# Patient Record
Sex: MALE | Race: WHITE | URBAN - METROPOLITAN AREA
[De-identification: names, ages, dates, MRNs, and addresses within clinical notes are randomized per-mention and may not be internally consistent; named-entity substitution may affect disease eponyms.]

---

## 2017-09-01 ENCOUNTER — TRANSFERRED RECORDS (OUTPATIENT)
Dept: HEALTH INFORMATION MANAGEMENT | Facility: CLINIC | Age: 67
End: 2017-09-01

## 2017-11-01 ENCOUNTER — PRE VISIT (OUTPATIENT)
Dept: NEUROLOGY | Facility: CLINIC | Age: 67
End: 2017-11-01

## 2017-11-01 PROBLEM — G24.4 OROMANDIBULAR DYSTONIA: Status: ACTIVE | Noted: 2017-11-01

## 2017-11-01 PROBLEM — R25.9 ABNORMAL INVOLUNTARY MOVEMENT: Status: ACTIVE | Noted: 2017-11-01

## 2017-11-01 PROBLEM — G24.4 OROFACIAL DYSKINESIA: Status: ACTIVE | Noted: 2017-11-01

## 2017-11-01 RX ORDER — INFLUENZA A VIRUS A/VICTORIA/4897/2022 IVR-238 (H1N1) ANTIGEN (FORMALDEHYDE INACTIVATED), INFLUENZA A VIRUS A/CALIFORNIA/122/2022 SAN-022 (H3N2) ANTIGEN (FORMALDEHYDE INACTIVATED), AND INFLUENZA B VIRUS B/MICHIGAN/01/2021 ANTIGEN (FORMALDEHYDE INACTIVATED) 60; 60; 60 UG/.5ML; UG/.5ML; UG/.5ML
INJECTION, SUSPENSION INTRAMUSCULAR
Refills: 0 | COMMUNITY
Start: 2017-10-28 | End: 2017-11-06

## 2017-11-01 RX ORDER — CLONAZEPAM 0.5 MG/1
0.5 TABLET ORAL AT BEDTIME
COMMUNITY
End: 2017-11-06

## 2017-11-01 RX ORDER — SIMVASTATIN 40 MG
40 TABLET ORAL AT BEDTIME
COMMUNITY
Start: 2017-09-06 | End: 2017-11-06

## 2017-11-01 RX ORDER — AMOXICILLIN 875 MG
TABLET ORAL
Refills: 0 | COMMUNITY
Start: 2017-02-07 | End: 2017-11-06

## 2017-11-01 RX ORDER — FLUTICASONE PROPIONATE 50 MCG
SPRAY, SUSPENSION (ML) NASAL
Refills: 11 | COMMUNITY
Start: 2016-11-12 | End: 2017-11-06

## 2017-11-01 RX ORDER — CLONAZEPAM 0.5 MG/1
TABLET ORAL
Refills: 0 | COMMUNITY
Start: 2017-01-10 | End: 2017-11-06

## 2017-11-01 RX ORDER — SIMVASTATIN 20 MG
20 TABLET ORAL AT BEDTIME
Refills: 3 | COMMUNITY
Start: 2017-05-19 | End: 2017-11-06 | Stop reason: DRUGHIGH

## 2017-11-01 RX ORDER — SIMVASTATIN 40 MG
40 TABLET ORAL AT BEDTIME
Refills: 3 | COMMUNITY
Start: 2017-09-10

## 2017-11-01 NOTE — TELEPHONE ENCOUNTER
Left message with detailed instructions requesting pt call the clinic back to complete previsit call. Kelsey George RN

## 2017-11-02 NOTE — PROGRESS NOTES
Summary and Recommendations:     Orofacial dyskinesias - onset with stress but movements persist despite improvement in stress. Mouth guard - lower mouth helps the symptoms. He has not been on medication for this but has tried magic mouthwash and has not taken pills or tried botox for this. No clear exposure to neuroleptic medication. Denies exposure to metoclopramide He has no family history of like condition. He has a brother Jeremi with parkinson disease (and  Had polio). He denies a family history of OCD, tics or learning disability and he has not had these features.     Information provided in avs about treatment options.     Tardive movement problems - which he does not have but there is useful information below:  Https://www.aan.com/Guidelines/home/GetGuidelineContent/613  https://www.aan.com/Guidelines/home/GetGuidelineContent/614    Gingko biloba 80mg 3 times per day. Monitor for affects on anticoagulation and antidepressants    Clonazepam (klonopin)  Option  - in the benzodiazepine/valium type of medication.     Then there are 3 newer types of medications with risks and if interested he would probably need to visit with Annalisa Giron D in neurology to review these medications.     TETRABENAZINE (NITOMAN )  Description and Uses  Tetrabenazine is used to treat movement disorders. Typically it is used for the treatment of hyperkinetic movement disorders such as [place] s disease and other conditions with chorea, hemiballismus, and tardive dyskinesia. It also may be employed for tic disorders including Gille s de la Tourette Syndrome. Hyperkinesias are characterized by excessive, involuntary purposeless and repetitive movements, which may involve the face, limbs or the entire body. Tetrabenazine works by depleting the brain.s supply of the neurochemical dopamine.  Dosage  12.5mg/day x 1 week; then 12.5mg twice daily x 1wk; then 25mg in am and 12.5mg in pm x 1wk then 25mg twice daily.  The dose is  adjusted based on tolerance and responsiveness to the medication.  Side Effects  ? Depression  ? Slowed movements of hands, arms, legs or head similar to Parkinson.s disease.  ? Drowsiness or Sleepiness  ? Digestive Problems  ? Lowering of Blood Pressure  ? Nervousness or Anxiety  ? Insomnia  Contact your doctor immediately if you have:  ? Difficulty swallowing  ? Choking attacks  ? Stiffness or tightness in arms or legs  ? Confusion or irrational thoughts  Do not take this medication at the same time as:  ? Levodopa  ? Antidepressants and MAO Inhibitors  ? Alcohol  Ordering Information  [facility]  [facility]  [address]  [facility]  Tel: [phone]  Fax: [phone]  Do not stop your medication without talking to your doctor  or a neurology resident on call.    Austedo (deutetrabenazine) dosed 6mg daily and can be increased in 6mg increments up to 48mg/day. 50% of patients reported improvement in their symptoms and 40% of providers observed improvement in their patients. This data is from patients with chorea in Slade's Disease. There is an increased risk of suicidal idea and depression with this medication but patient denies any mood disorder. Side effects include sleepiness, diarrhea, tiredness, dry mouth, prolonged QT interval. This medication costs ~$4000 for #60 of the 6mg capsules.    Ingrezza (valbenazine) dosed 40mg daily for 1 week and then can increase to 80mg daily. 1 in 4 patients treated for 6 weeks on 80mg of Ingrezza have severity of symptoms reduced by >50%. Brief review of studies showed this data is from patients with TD due to exposure to neuroleptics. Side effects: somnolence, increased QT interval. Patient is not on any other medications that would contribute to prolonged QT interval. This is a specialty medication and likely would have to be filled through a specialty pharmacy. Cost  ~$5000-10,000/month.  Specialty Pharmacy could help us navigate coverage for the patient.    Consideration for botox if desired and would inject masseter/temporalis through neurology, PMR or/and ENT.    Continue to use the mouth guard and chewing gum to distract them from the involuntary movements, ie a sensory trick to address this.     Jose Browning MD  _____________________________________________________________________  PATIENT: [place] Parmar  67 year old male   : 1950  CINDY: 2017    Consult requested by pcp/specialist    Outside records reviewed and revealed  - inserted.     History obtained from patient    History of Present Illness  68 yo man with orofacial involuntary movements  4 yr history of food in teeth and food stuck in gums and has worsening of symptoms as the day progresses. Food triggers it. He has to constantly lick his lips and has movement of his mouth and rubbing of his tongue. Wearing a splint helps. He had onset of symptoms after loss of a job but despite resolution of the stress the movements continue.     Medications  Lidocaine  amoxicilline  Clonazepam  flonase  fluzone  Simvastatin    Wears glasses  Hearing is affected  [place] and thinks he has altered smell for a long time  No constipation or diarrhea  He has been on prilosec for 30 yrs and takes it as needed  Endo: on simvastatin for cholesterol. Denies diabetes or thyroid  Denies heart problems  Denies lung problems  Msk: denies  Allergies: denies  Id: denies  Heme: denies  Psych: none  Neuro: none  Sleep: as well as people his age sleeps at night. May snore occasionally.  Denies RBD features  Wakes up a lot at night  Light sleeper. Lives in a corner house.     Denies exposure to psychiatric medications  Denies reglan or metoclopramide.   Memory has been good.     He denies parkinson features  - handwriting is okay, walking is okay, voice is okay.     He has movement of his tongue in his mouth  without him being aware of it moving.  He has to be cognizant to keep it from moving around  He notes that it moves to his teeth.   He has a sensation in his mouth =- it is a ache like after a strong teeth cleaning  He has a sensation that there is food in his teeth.   When he eats - afterwards he has this sensation the most  He has worsening of symptoms at the end of the day  He has a mouth guard and not sure if he grinds his teeth at night.   He may have some clenching of his teeth - more at night than during the day.   He does some jaw pain when he has an active day of more sensation in his mouth.   His major complaint is the persistent sensation which has been present for 3-4 yrs.   He has seen dentists, etc.   He has tried magic mouth wash.   He has not tried botox.     14 Review of systems  are negative except for   Patient Active Problem List   Diagnosis     Acrochordon     Pruritus ani      No Known Allergies  No past surgical history on file.  No past medical history on file.  Social History     Social History     Marital status:      Spouse name: N/A     Number of children: N/A     Years of education: N/A     Occupational History     Not on file.     Social History Main Topics     Smoking status: Never Smoker     Smokeless tobacco: Never Used     Alcohol use Yes     Drug use: Not on file     Sexual activity: Not on file     Other Topics Concern     Not on file     Social History Narrative     No narrative on file     Family History   Problem Relation Age of Onset     Alzheimer Disease Mother      Cataracts Mother      Stomach Cancer Father      Parkinsonism Other      brother     Substance Abuse Other      brother     Colon Cancer Other      brother     Bronchitis Other      brother     Obesity Other      sister     Current Outpatient Prescriptions   Medication Sig Dispense Refill     simvastatin (ZOCOR) 40 MG tablet Take 40 mg by mouth At Bedtime       clonazePAM (KLONOPIN) 0.5 MG tablet Take 0.5  mg by mouth At Bedtime       MAGIC MOUTHWASH, ENTER INGREDIENTS IN COMMENTS, SWISH AND SPIT 5 ML'S FOR 2-3 MIN 4-5 TIMES DAILY FOR 2 WEEKS THEN USE AS NEEDED  11     alum & mag hydroxide-simethicone 80 mL, diphenhydrAMINE 80 mL, lidocaine (viscous) 80 mL magic mouthwash SWISH AND SPIT 5 ML'S FOR 2-3 MIN 4-5 TIMES DAILY FOR 2 WEEKS THEN USE AS NEEDED  11     Mouthwash Compounding Base (MOUTH WASH-GP) LIQD rhodus  12     amoxicillin (AMOXIL) 875 MG tablet TAKE 1 TABLET (875 MG TOTAL) BY MOUTH 2 (TWO) TIMES A DAY FOR 10 DAYS.  0     clonazePAM (KLONOPIN) 0.5 MG tablet   0     fluticasone (FLONASE) 50 MCG/ACT spray PLACE 2 SPRAYS INTO BOTH NOSTRILS DAILY.  11     FLUZONE HIGH-DOSE 0.5 ML injection TO BE ADMINISTERED BY PHARMACIST FOR IMMUNIZATION  0     simvastatin (ZOCOR) 20 MG tablet Take 20 mg by mouth At Bedtime  3     simvastatin (ZOCOR) 40 MG tablet Take 40 mg by mouth At Bedtime  3         Meds                                                                                                                                                                           Examination  B/P: Data Unavailable, T: Data Unavailable, P: Data Unavailable, R: Data Unavailable 0 lbs 0 oz  There were no vitals taken for this visit., There is no height or weight on file to calculate BMI.    Vitals signs were added and reviewed if not above. Please refer to the chart from this visit.    General examination: well developed, nourished and normal affect  Carotid: No bruits. Chest CTA, Heart regular without gallops or murmurs. Abdomen soft nontender, no masses, bowel sounds intact. Periphery: normal pulses without edema. No skin lesions. MENTAL STATUS:  Alert, oriented x3.  Speech fluent with normal naming, repetition, comprehension.  Good right-left orientation, Can remember 3/3 objects.  5/5 on spelling world backwards.    CRANIAL NERVES:  Disks flat. Pupils are equal, round, reactive to light.  Normal vascularity and fields. Extraocular  movements full.  Facial sensation and movement normal.  Hearing intact. Palate moves symmetrically.  Tongue midline.  Sternocleidomastoid and trapezius strength intact.  Neck strength was normal.  NEUROLOGIC:  Tone: paratonia - probably normal3. Motor in upper and lower extremities. 5/5.  Reflexes 2/4.  Toe signs downgoing.  Good finger-nose-finger, fine finger movement, heel-shin maneuver, sensation to light touch, position sense and vibration and temperature was normal. Gait normal. Romberg and postural stability normal and no tremor noted.     No parkinsonian features noted and the tongue and orofacial movements were not noted.     Allergies    No Known Allergies  Current Medications  Reconcile with Patient's Chart    Prescription Sig. Disp. Refills Start Date End Date Status   clonazePAM (KLONOPIN) 0.5 MG tablet   Take 0.5 mg by mouth daily at bedtime.         Active   simvastatin (ZOCOR) 40 MG tablet    Indications: Hyperlipidemia, unspecified hyperlipidemia type Take 1 Tab by mouth daily at bedtime. 90 Tab   3 09/06/2017 09/06/2018 Active   Active Problems  Reconcile with Patient's Chart    Problem Noted Date   Pruritus ani 03/19/2013   Acrochordon 03/19/2013     Immunizations    Name Dates Previously Given Next Due   Flu Vac (3+ yrs) 11/11/2014     Flu Vac Preserv Free (3+yrs) 10/18/2012, 11/26/2003     Influenza (Fluzone HighDose, 65+ yrs) 11/11/2016     PCV13 (Prevnar) 06/30/2015     PPSV23 (Pneumovax) 11/11/2016     TDAP (BOOSTRIX) 03/19/2013     Tdap 04/08/2014     Zoster (shingles) 04/08/2014     Family History    Medical History Relation Name Comments   Cancer, Stomach Birth Father       Alzheimer's Birth Mother       Cataract Birth Mother       Parkinsons Brother       Drug Abuse Brother   alcoholism   Cancer, Colon Brother       COPD Brother       Obesity Sister   s/p bariatric surgery   Amblyopia/Strabismus Negative Family History       Blindness Negative Family History       Diabetes Negative Family  History       Glaucoma Negative Family History       Macular Degeneration Negative Family History       Retinal Detachment Negative Family History         Relation Name Status Comments   Birth Father        Birth Mother        Brother   Alive     Brother   Alive     Brother        Brother        Sister   Alive     Sister   Alive     Social History    Tobacco Use Types Packs/Day Years Used Date   Never Smoker           Smokeless Tobacco: Never Used           Alcohol Use Drinks/Week oz/Week Comments   Yes 8 Glasses of wine    0 Cans of beer    0 Shots of liquor    6 Standard drinks or equivalent   7.8        Sex Assigned at Birth Date Recorded   Not on file     Last Filed Vital Signs    Vital Sign Reading Time Taken   Blood Pressure 139/79 2017 1:43 PM CDT   Pulse 63 2017 1:43 PM CDT   Temperature 36.7  C (98.1  F) 2013 8:53 AM CDT   Respiratory Rate 16 2013 8:53 AM CDT   Oxygen Saturation - -   Inhaled Oxygen Concentration - -   Weight 99.3 kg (219 lb) 2017 1:05 PM CDT   Height 182.9 cm (6') 2016 8:07 AM CST   Body Mass Index 29.7 2017 1:05 PM CDT   Plan of Treatment    Health Maintenance Due Date Last Done Comments   Colon Cancer Screening Plan Due 1950       Medicare Annual Wellness Visit 1950       Advanced Directive 2014       Influenza (#1) 2017, 2014, 10/18/2012, Additional history exists     Cholesterol 2022, 2016, 2013     DTaP/Tdap/Td (2 - Td) 2024, 2013     Zoster (Shingles) Completed 2014     Hep C Screening (Preventive Services) Completed 2016     Pneumococcal Completed 2016, 2015     Results  - from Last 3 Months    Table of Contents for Results  Lipid Panel and Direct LDL(If Needed) (2017 8:10 AM)  Comp Metabolic Panel (2017 8:10 AM)  Prostatic Specific Antigen (Screen) (2017 8:10 AM)  Hemoglobin  "A1C Glycosylated (09/01/2017 8:10 AM)  CT Chest WO IV Cont (09/01/2017 8:05 AM)  UA (Micro if Positive) (CLINIC) (08/24/2017 2:05 PM)       Lipid Panel and Direct LDL(If Needed) (09/01/2017 8:10 AM)  Lipid Panel and Direct LDL(If Needed) (09/01/2017 8:10 AM)   Component Value Ref Range   Cholesterol 207 (H) 0 - 199 mg/dL   Triglycerides 67 4 - 149 mg/dL   HDL Cholesterol 58 >39 mg/dL   Cholesterol/HDL Ratio Screen 3.6     LDL Calculated 136 (H) 19 - 130 mg/dL   Length Of Fast 12.0       Lipid Panel and Direct LDL(If Needed) (09/01/2017 8:10 AM)   Specimen Performing Laboratory      SOFT    [address] [place] [address]       Lipid Panel and Direct LDL(If Needed) (09/01/2017 8:10 AM)   Narrative   \"Performed at [facility], [address] [place] [address]  [address] CLIA# [id number]\"     Back to top of Results      Comp Metabolic Panel (09/01/2017 8:10 AM)  Comp Metabolic Panel (09/01/2017 8:10 AM)   Component Value Ref Range   Aspartate Aminotransferase 29 10 - 40 U/L   Lab Glucose 112 (H)  Comment:   The stated glucose range is for the fasting state.  Non-fasting glucose range is  mg/dL     70 - 100 mg/dL   Bilirubin Total 0.5 0.2 - 1.2 mg/dL   Calcium 8.7 8.4 - 10.2 mg/dL   Sodium 139 136 - 145 mmol/L   Potassium 4.3 3.5 - 5.2 mmol/L   Blood Urea Nitrogen 11 9 - 26 mg/dL   Albumin 3.8 3.4 - 5.0 g/dL   Chloride 106 98 - 109 mmol/L   Alk Phos 66 40 - 150 U/L   Protein Total, Serum 7.0 6.4 - 8.3 g/dL   Creatinine Serum 1.00 0.73 - 1.18 mg/dL   Est GFR African Am >60 >60 mL/min/1.73m2   Est GFR Non-Afr Am >60  Comment:   Normal>60, moderate decrease 30 - 59, severe decrease 15 - 29,  renal failure <15 mL/min/1.73 m2  NOTE:  Choose the eGFR result above appropriate for the race of the patient.     >60 mL/min/1.73m2   Alanine Aminotransferase 43 9 - 55 U/L   CO2 23 22 - 31 mmol/L     Comp Metabolic Panel (09/01/2017 8:10 AM)   Specimen Performing Laboratory     " "[address] [address]       Comp Metabolic Panel (09/01/2017 8:10 AM)   Narrative   \"Performed at [facility], [address] [place] [address]  [address] CLIA# [id number]\"     Back to top of Results      Prostatic Specific Antigen (Screen) (09/01/2017 8:10 AM)  Prostatic Specific Antigen (Screen) (09/01/2017 8:10 AM)   Component Value Ref Range   Prostate Specific Antigen 0.7  Comment:   The Abbott PSA Chemiluminescent immunoassay is used.  Results obtained with different test methods or kits  cannot be used interchangeably.     0.0 - 4.0 ng/mL     Prostatic Specific Antigen (Screen) (09/01/2017 8:10 AM)   Specimen Performing Laboratory     [address] [address]       Prostatic Specific Antigen (Screen) (09/01/2017 8:10 AM)   Narrative   \"Performed at [facility], [address] [place] [address] CLIA number [id number]\"     Back to top of Results      Hemoglobin A1C Glycosylated (09/01/2017 8:10 AM)  Hemoglobin A1C Glycosylated (09/01/2017 8:10 AM)   Component Value Ref Range   HGB A1C 5.9 (H) 4.0 - 5.6 %     Hemoglobin A1C Glycosylated (09/01/2017 8:10 AM)   Specimen Performing Laboratory     [address] [address]       Hemoglobin A1C Glycosylated (09/01/2017 8:10 AM)   Narrative   \"Performed at [address] [address] CLIA number [id number]\"     Back to top of Results      CT Chest WO IV Cont (09/01/2017 8:05 AM)  CT Chest WO IV Cont (09/01/2017 8:05 AM)   Specimen Performing Laboratory     PN RADIOLOGY       CT Chest WO IV Cont (09/01/2017 8:05 AM)   Impressions   IMPRESSION:          1. No etiology for left-sided chest wall pain identified.        2. Otherwise unremarkable chest CT. Incidental note of coronary artery calcification.            CT Chest WO IV Cont (09/01/2017 8:05 AM)   Narrative "   COMPARISON:  None.        TECHNIQUE:  Noncontrast images were obtained through the chest.        FINDINGS: There is some mild dependent atelectasis. Lungs are otherwise clear. No pulmonary nodules present. There is a solitary focus of pleural thickening medially on the right on image 35 with some central calcification which is likely post inflammatory. There is no evidence of diffuse calcified pleural plaque to suggest prior asbestos exposure. The mediastinum is within normal limits with no lymphadenopathy. Coronary artery calcification noted. Visualized portions of the upper abdomen are unremarkable. No rib fractures or other significant osseous abnormalities are identified.           CT Chest WO IV Cont (09/01/2017 8:05 AM)   Procedure Note   Momo, Rad Results In - 09/01/2017 8:32 AM CDT    COMPARISON: None.    TECHNIQUE: Noncontrast images were obtained through the chest.    FINDINGS: There is some mild dependent atelectasis. Lungs are otherwise clear. No pulmonary nodules present. There is a solitary focus of pleural thickening medially on the right on image 35 with some central calcification which is likely post inflammatory. There is no evidence of diffuse calcified pleural plaque to suggest prior asbestos exposure. The mediastinum is within normal limits with no lymphadenopathy. Coronary artery calcification noted. Visualized portions of the upper abdomen are unremarkable. No rib fractures or other significant osseous abnormalities are identified.    IMPRESSION  IMPRESSION:     1. No etiology for left-sided chest wall pain identified.    2. Otherwise unremarkable chest CT. Incidental note of coronary artery calcification.        Back to top of Results      UA (Micro if Positive) (CLINIC) (08/24/2017 2:05 PM)  UA (Micro if Positive) (CLINIC) (08/24/2017 2:05 PM)   Component Value Ref Range   Urine Type URINE:clean cat     Turbidity Clear Clear   U BILI Negative Negative   Blood Urine Negative Neg - Trace  "  Glucose, Qualitative U Negative Neg-30 mg/dL   Ketones Negative Negative   Leukocyte Esterase Urine Negative Negative   Nitrite Urine Negative Negative   pH Urine 7.5 5.0 - 8.0   Protein Urine Negative Neg - Trace mg/dL   U Specific Gravity 1.010 1.005 - 1.030   Urobilinogen Urine Negative Negative Eu/dL     UA (Micro if Positive) (CLINIC) (08/24/2017 2:05 PM)   Specimen Performing Laboratory   Urine PN SOFT    [address] [place] [address]       UA (Micro if Positive) (CLINIC) (08/24/2017 2:05 PM)   Narrative   \"Performed at [facility], [address] [place] [address]  [address] CLIA# [id number]\"     Back to top of Results  Insurance  Payer Benefit Plan / Group Subscriber ID Type Phone Address   [employer] PPO [id number] Commercial [phone] [id number]    [address] [address]     MEDICARE MEDICARE MANAGED CARE BCBS [id number] Medicare [phone] ATTN CLAIMS    [address]    [place] [address]     BCBS BCBS PLATINUM BLUE [id number] Medicare   [address] [address]    [place] [address]       Guarantor Name Account Type Relation to Patient Date of Birth Phone Billing Address   MEGAN MIJARES Personal/Family Self 1950 Home: [phone]   [address] [place] [address]     MEGAN MIJARES Personal/Family Self 1950 Home: [phone]   [address] [place] [address]     MEGAN MIJARES Personal/Family Self 1950 Home: [phone]   [address] [place] [address]     Document Information  Primary Care Provider  Valerie L Saladin PA-C (Oct. 28, 2016 - Present)   Document Coverage Dates  [birth year]. 22, 1950 - Nov. 01, 2017   Organization  [employer]  [phone] (Work)  [place] [address]    "

## 2017-11-06 ENCOUNTER — OFFICE VISIT (OUTPATIENT)
Dept: NEUROLOGY | Facility: CLINIC | Age: 67
End: 2017-11-06
Payer: COMMERCIAL

## 2017-11-06 VITALS
BODY MASS INDEX: 30.4 KG/M2 | OXYGEN SATURATION: 97 % | SYSTOLIC BLOOD PRESSURE: 147 MMHG | HEIGHT: 72 IN | HEART RATE: 56 BPM | DIASTOLIC BLOOD PRESSURE: 90 MMHG | WEIGHT: 224.4 LBS

## 2017-11-06 DIAGNOSIS — R25.9 ABNORMAL INVOLUNTARY MOVEMENT: Primary | ICD-10-CM

## 2017-11-06 DIAGNOSIS — G24.4 OROFACIAL DYSKINESIA: ICD-10-CM

## 2017-11-06 PROCEDURE — 99204 OFFICE O/P NEW MOD 45 MIN: CPT | Performed by: PSYCHIATRY & NEUROLOGY

## 2017-11-06 RX ORDER — NICOTINE POLACRILEX 4 MG/1
20 GUM, CHEWING ORAL DAILY
Qty: 30 TABLET | COMMUNITY
Start: 2017-11-06 | End: 2017-11-06

## 2017-11-06 RX ORDER — NICOTINE POLACRILEX 4 MG/1
20 GUM, CHEWING ORAL DAILY PRN
Qty: 30 TABLET | COMMUNITY
Start: 2017-11-06

## 2017-11-06 ASSESSMENT — PAIN SCALES - GENERAL: PAINLEVEL: NO PAIN (0)

## 2017-11-06 NOTE — LETTER
11/6/2017       RE: Los Parmar  [address] [facility] [address]     Dear Colleague,    Thank you for referring your patient, Los Parmar, to the [facility]. Please see a copy of my visit note below.    Summary and Recommendations:     Orofacial dyskinesias - onset with stress but movements persist despite improvement in stress. Mouth guard - lower mouth helps the symptoms. He has not been on medication for this but has tried magic mouthwash and has not taken pills or tried botox for this. No clear exposure to neuroleptic medication. Denies exposure to metoclopramide He has no family history of like condition. He has a brother Jeremi with parkinson disease (and  Had polio). He denies a family history of OCD, tics or learning disability and he has not had these features.     Information provided in avs about treatment options.     Tardive movement problems - which he does not have but there is useful information below:  Https://www.aan.com/Guidelines/home/GetGuidelineContent/613  https://www.aan.com/Guidelines/home/GetGuidelineContent/614    Gingko biloba 80mg 3 times per day. Monitor for affects on anticoagulation and antidepressants    Clonazepam (klonopin)  Option  - in the benzodiazepine/valium type of medication.     Then there are 3 newer types of medications with risks and if interested he would probably need to visit with Tara Yoo, Pharm D in neurology to review these medications.     TETRABENAZINE (NITOMAN )  Description and Uses  Tetrabenazine is used to treat movement disorders. Typically it is used for the treatment of hyperkinetic movement disorders such as Habersham s disease and other conditions with chorea, hemiballismus, and tardive dyskinesia. It also may be employed for tic disorders including Gille s de la Tourette Syndrome. Hyperkinesias are characterized by excessive, involuntary purposeless and  repetitive movements, which may involve the face, limbs or the entire body. Tetrabenazine works by depleting the brain.s supply of the neurochemical dopamine.  Dosage  12.5mg/day x 1 week; then 12.5mg twice daily x 1wk; then 25mg in am and 12.5mg in pm x 1wk then 25mg twice daily.  The dose is adjusted based on tolerance and responsiveness to the medication.  Side Effects  ? Depression  ? Slowed movements of hands, arms, legs or head similar to Parkinson.s disease.  ? Drowsiness or Sleepiness  ? Digestive Problems  ? Lowering of Blood Pressure  ? Nervousness or Anxiety  ? Insomnia  Contact your doctor immediately if you have:  ? Difficulty swallowing  ? Choking attacks  ? Stiffness or tightness in arms or legs  ? Confusion or irrational thoughts  Do not take this medication at the same time as:  ? Levodopa  ? Antidepressants and MAO Inhibitors  ? Alcohol  Ordering Information  [facility]  [facility] [address]  [facility]  Tel: [phone]  Fax: [phone]  Do not stop your medication without talking to your doctor  or a neurology resident on call.    Austedo (deutetrabenazine) dosed 6mg daily and can be increased in 6mg increments up to 48mg/day. 50% of patients reported improvement in their symptoms and 40% of providers observed improvement in their patients. This data is from patients with chorea in Saint Ignace's Disease. There is an increased risk of suicidal idea and depression with this medication but patient denies any mood disorder. Side effects include sleepiness, diarrhea, tiredness, dry mouth, prolonged QT interval. This medication costs ~$4000 for #60 of the 6mg capsules.    Ingrezza (valbenazine) dosed 40mg daily for 1 week and then can increase to 80mg daily. 1 in 4 patients treated for 6 weeks on 80mg of Ingrezza have severity of symptoms reduced by >50%. Brief review of studies showed this data  is from patients with TD due to exposure to neuroleptics. Side effects: somnolence, increased QT interval. Patient is not on any other medications that would contribute to prolonged QT interval. This is a specialty medication and likely would have to be filled through a specialty pharmacy. Cost ~$5000-10,000/month. [facility] could help us navigate coverage for the patient.    Consideration for botox if desired and would inject masseter/temporalis through neurology, PMR or/and ENT.    Continue to use the mouth guard and chewing gum to distract them from the involuntary movements, ie a sensory trick to address this.     Jose Browning MD  _____________________________________________________________________  PATIENT: Los Parmar  67 year old male   : 1950  CINDY: 2017    Consult requested by pcp/specialist    Outside records reviewed and revealed  - inserted.     History obtained from patient    History of Present Illness  66 yo man with orofacial involuntary movements  4 yr history of food in teeth and food stuck in gums and has worsening of symptoms as the day progresses. Food triggers it. He has to constantly lick his lips and has movement of his mouth and rubbing of his tongue. Wearing a splint helps. He had onset of symptoms after loss of a job but despite resolution of the stress the movements continue.     Medications  Lidocaine  amoxicilline  Clonazepam  flonase  fluzone  Simvastatin    Wears glasses  Hearing is affected  [place] and thinks he has altered smell for a long time  No constipation or diarrhea  He has been on prilosec for 30 yrs and takes it as needed  Endo: on simvastatin for cholesterol. Denies diabetes or thyroid  Denies heart problems  Denies lung problems  Msk: denies  Allergies: denies  Id: denies  Heme: denies  Psych: none  Neuro: none  Sleep: as well as people his age sleeps at night. May snore occasionally.  Denies RBD features  Wakes up a lot  at night  Light sleeper. Lives in a corner house.     Denies exposure to psychiatric medications  Denies reglan or metoclopramide.   Memory has been good.     He denies parkinson features  - handwriting is okay, walking is okay, voice is okay.     He has movement of his tongue in his mouth without him being aware of it moving.  He has to be cognizant to keep it from moving around  He notes that it moves to his teeth.   He has a sensation in his mouth =- it is a ache like after a strong teeth cleaning  He has a sensation that there is food in his teeth.   When he eats - afterwards he has this sensation the most  He has worsening of symptoms at the end of the day  He has a mouth guard and not sure if he grinds his teeth at night.   He may have some clenching of his teeth - more at night than during the day.   He does some jaw pain when he has an active day of more sensation in his mouth.   His major complaint is the persistent sensation which has been present for 3-4 yrs.   He has seen dentists, etc.   He has tried magic mouth wash.   He has not tried botox.     14 Review of systems  are negative except for   Patient Active Problem List   Diagnosis     Acrochordon     Pruritus ani      No Known Allergies  No past surgical history on file.  No past medical history on file.  Social History     Social History     Marital status:      Spouse name: N/A     Number of children: N/A     Years of education: N/A     Occupational History     Not on file.     Social History Main Topics     Smoking status: Never Smoker     Smokeless tobacco: Never Used     Alcohol use Yes     Drug use: Not on file     Sexual activity: Not on file     Other Topics Concern     Not on file     Social History Narrative     No narrative on file     Family History   Problem Relation Age of Onset     Alzheimer Disease Mother      Cataracts Mother      Stomach Cancer Father      Parkinsonism Other      brother     Substance Abuse Other      brother      Colon Cancer Other      brother     Bronchitis Other      brother     Obesity Other      sister     Current Outpatient Prescriptions   Medication Sig Dispense Refill     simvastatin (ZOCOR) 40 MG tablet Take 40 mg by mouth At Bedtime       clonazePAM (KLONOPIN) 0.5 MG tablet Take 0.5 mg by mouth At Bedtime       MAGIC MOUTHWASH, ENTER INGREDIENTS IN COMMENTS, SWISH AND SPIT 5 ML'S FOR 2-3 MIN 4-5 TIMES DAILY FOR 2 WEEKS THEN USE AS NEEDED  11     alum & mag hydroxide-simethicone 80 mL, diphenhydrAMINE 80 mL, lidocaine (viscous) 80 mL magic mouthwash SWISH AND SPIT 5 ML'S FOR 2-3 MIN 4-5 TIMES DAILY FOR 2 WEEKS THEN USE AS NEEDED  11     Mouthwash Compounding Base (MOUTH WASH-GP) LIQLIZET kinsey  12     amoxicillin (AMOXIL) 875 MG tablet TAKE 1 TABLET (875 MG TOTAL) BY MOUTH 2 (TWO) TIMES A DAY FOR 10 DAYS.  0     clonazePAM (KLONOPIN) 0.5 MG tablet   0     fluticasone (FLONASE) 50 MCG/ACT spray PLACE 2 SPRAYS INTO BOTH NOSTRILS DAILY.  11     FLUZONE HIGH-DOSE 0.5 ML injection TO BE ADMINISTERED BY PHARMACIST FOR IMMUNIZATION  0     simvastatin (ZOCOR) 20 MG tablet Take 20 mg by mouth At Bedtime  3     simvastatin (ZOCOR) 40 MG tablet Take 40 mg by mouth At Bedtime  3         Meds                                                                                                                                                                           Examination  B/P: Data Unavailable, T: Data Unavailable, P: Data Unavailable, R: Data Unavailable 0 lbs 0 oz  There were no vitals taken for this visit., There is no height or weight on file to calculate BMI.    Vitals signs were added and reviewed if not above. Please refer to the chart from this visit.    General examination: well developed, nourished and normal affect  Carotid: No bruits. Chest CTA, Heart regular without gallops or murmurs. Abdomen soft nontender, no masses, bowel sounds intact. Periphery: normal pulses without edema. No skin lesions. MENTAL STATUS:   Alert, oriented x3.  Speech fluent with normal naming, repetition, comprehension.  Good right-left orientation, Can remember 3/3 objects.  5/5 on spelling world backwards.    CRANIAL NERVES:  Disks flat. Pupils are equal, round, reactive to light.  Normal vascularity and fields. Extraocular movements full.  Facial sensation and movement normal.  Hearing intact. Palate moves symmetrically.  Tongue midline.  Sternocleidomastoid and trapezius strength intact.  Neck strength was normal.  NEUROLOGIC:  Tone: paratonia - probably normal3. Motor in upper and lower extremities. 5/5.  Reflexes 2/4.  Toe signs downgoing.  Good finger-nose-finger, fine finger movement, heel-shin maneuver, sensation to light touch, position sense and vibration and temperature was normal. Gait normal. Romberg and postural stability normal and no tremor noted.     No parkinsonian features noted and the tongue and orofacial movements were not noted.     Allergies    No Known Allergies  Current Medications  Reconcile with Patient's Chart    Prescription Sig. Disp. Refills Start Date End Date Status   clonazePAM (KLONOPIN) 0.5 MG tablet   Take 0.5 mg by mouth daily at bedtime.         Active   simvastatin (ZOCOR) 40 MG tablet    Indications: Hyperlipidemia, unspecified hyperlipidemia type Take 1 Tab by mouth daily at bedtime. 90 Tab   3 09/06/2017 09/06/2018 Active   Active Problems  Reconcile with Patient's Chart    Problem Noted Date   Pruritus ani 03/19/2013   Acrochordon 03/19/2013     Immunizations    Name Dates Previously Given Next Due   Flu Vac (3+ yrs) 11/11/2014     Flu Vac Preserv Free (3+yrs) 10/18/2012, 11/26/2003     Influenza (Fluzone HighDose, 65+ yrs) 11/11/2016     PCV13 (Prevnar) 06/30/2015     PPSV23 (Pneumovax) 11/11/2016     TDAP (BOOSTRIX) 03/19/2013     Tdap 04/08/2014     Zoster (shingles) 04/08/2014     Family History    Medical History Relation Name Comments   Cancer, Stomach Birth Father       Alzheimer's Birth Mother        Cataract Birth Mother       Parkinsons Brother       Drug Abuse Brother   alcoholism   Cancer, Colon Brother       COPD Brother       Obesity Sister   s/p bariatric surgery   Amblyopia/Strabismus Negative Family History       Blindness Negative Family History       Diabetes Negative Family History       Glaucoma Negative Family History       Macular Degeneration Negative Family History       Retinal Detachment Negative Family History         Relation Name Status Comments   Birth Father        Birth Mother        Brother   Alive     Brother   Alive     Brother        Brother        Sister   Alive     Sister   Alive     Social History    Tobacco Use Types Packs/Day Years Used Date   Never Smoker           Smokeless Tobacco: Never Used           Alcohol Use Drinks/Week oz/Week Comments   Yes 8 Glasses of wine    0 Cans of beer    0 Shots of liquor    6 Standard drinks or equivalent   7.8        Sex Assigned at Birth Date Recorded   Not on file     Last Filed Vital Signs    Vital Sign Reading Time Taken   Blood Pressure 139/79 2017 1:43 PM CDT   Pulse 63 2017 1:43 PM CDT   Temperature 36.7  C (98.1  F) 2013 8:53 AM CDT   Respiratory Rate 16 2013 8:53 AM CDT   Oxygen Saturation - -   Inhaled Oxygen Concentration - -   Weight 99.3 kg (219 lb) 2017 1:05 PM CDT   Height 182.9 cm (6') 2016 8:07 AM CST   Body Mass Index 29.7 2017 1:05 PM CDT   Plan of Treatment    Health Maintenance Due Date Last Done Comments   Colon Cancer Screening Plan Due 1950       Medicare Annual Wellness Visit 1950       Advanced Directive 2014       Influenza (#1) 2017, 2014, 10/18/2012, Additional history exists     Cholesterol 2022, 2016, 2013     DTaP/Tdap/Td (2 - Td) 2024, 2013     Zoster (Shingles) Completed 2014     Hep C Screening (Preventive Services) Completed  "11/11/2016     Pneumococcal Completed 11/11/2016, 06/30/2015     Results  - from Last 3 Months    Table of Contents for Results  Lipid Panel and Direct LDL(If Needed) (09/01/2017 8:10 AM)  Comp Metabolic Panel (09/01/2017 8:10 AM)  Prostatic Specific Antigen (Screen) (09/01/2017 8:10 AM)  Hemoglobin A1C Glycosylated (09/01/2017 8:10 AM)  CT Chest WO IV Cont (09/01/2017 8:05 AM)  UA (Micro if Positive) (CLINIC) (08/24/2017 2:05 PM)       Lipid Panel and Direct LDL(If Needed) (09/01/2017 8:10 AM)  Lipid Panel and Direct LDL(If Needed) (09/01/2017 8:10 AM)   Component Value Ref Range   Cholesterol 207 (H) 0 - 199 mg/dL   Triglycerides 67 4 - 149 mg/dL   HDL Cholesterol 58 >39 mg/dL   Cholesterol/HDL Ratio Screen 3.6     LDL Calculated 136 (H) 19 - 130 mg/dL   Length Of Fast 12.0       Lipid Panel and Direct LDL(If Needed) (09/01/2017 8:10 AM)   Specimen Performing Laboratory      SOFT    [address] [place] [address]       Lipid Panel and Direct LDL(If Needed) (09/01/2017 8:10 AM)   Narrative   \"Performed at [facility], [address] [place] [address]  [address] CLIA# [id number]\"     Back to top of Results      Comp Metabolic Panel (09/01/2017 8:10 AM)  Comp Metabolic Panel (09/01/2017 8:10 AM)   Component Value Ref Range   Aspartate Aminotransferase 29 10 - 40 U/L   Lab Glucose 112 (H)  Comment:   The stated glucose range is for the fasting state.  Non-fasting glucose range is  mg/dL     70 - 100 mg/dL   Bilirubin Total 0.5 0.2 - 1.2 mg/dL   Calcium 8.7 8.4 - 10.2 mg/dL   Sodium 139 136 - 145 mmol/L   Potassium 4.3 3.5 - 5.2 mmol/L   Blood Urea Nitrogen 11 9 - 26 mg/dL   Albumin 3.8 3.4 - 5.0 g/dL   Chloride 106 98 - 109 mmol/L   Alk Phos 66 40 - 150 U/L   Protein Total, Serum 7.0 6.4 - 8.3 g/dL   Creatinine Serum 1.00 0.73 - 1.18 mg/dL   Est GFR African Am >60 >60 mL/min/1.73m2   Est GFR Non-Afr Am >60  Comment:   Normal>60, moderate decrease 30 - 59, severe " "decrease 15 - 29,  renal failure <15 mL/min/1.73 m2  NOTE:  Choose the eGFR result above appropriate for the race of the patient.     >60 mL/min/1.73m2   Alanine Aminotransferase 43 9 - 55 U/L   CO2 23 22 - 31 mmol/L     Comp Metabolic Panel (09/01/2017 8:10 AM)   Specimen Performing Laboratory     PN SOFT    [address] [place] [address]       Comp Metabolic Panel (09/01/2017 8:10 AM)   Narrative   \"Performed at [facility], [address] [place] [address]  [address] CLIA# [id number]\"     Back to top of Results      Prostatic Specific Antigen (Screen) (09/01/2017 8:10 AM)  Prostatic Specific Antigen (Screen) (09/01/2017 8:10 AM)   Component Value Ref Range   Prostate Specific Antigen 0.7  Comment:   The Abbott PSA Chemiluminescent immunoassay is used.  Results obtained with different test methods or kits  cannot be used interchangeably.     0.0 - 4.0 ng/mL     Prostatic Specific Antigen (Screen) (09/01/2017 8:10 AM)   Specimen Performing Laboratory      SOFT    [address] [place] [address]       Prostatic Specific Antigen (Screen) (09/01/2017 8:10 AM)   Narrative   \"Performed at [address] [address] CLIA number [id number]\"     Back to top of Results      Hemoglobin A1C Glycosylated (09/01/2017 8:10 AM)  Hemoglobin A1C Glycosylated (09/01/2017 8:10 AM)   Component Value Ref Range   HGB A1C 5.9 (H) 4.0 - 5.6 %     Hemoglobin A1C Glycosylated (09/01/2017 8:10 AM)   Specimen Performing Laboratory      SOFT    [address] [place] [address]       Hemoglobin A1C Glycosylated (09/01/2017 8:10 AM)   Narrative   \"Performed at [address] [address] CLIA number [id number]\"     Back to top of Results      CT Chest WO IV Cont (09/01/2017 8:05 AM)  CT Chest WO IV Cont (09/01/2017 8:05 AM)   Specimen Performing Laboratory     PN RADIOLOGY       CT Chest WO " IV Cont (09/01/2017 8:05 AM)   Impressions   IMPRESSION:          1. No etiology for left-sided chest wall pain identified.        2. Otherwise unremarkable chest CT. Incidental note of coronary artery calcification.            CT Chest WO IV Cont (09/01/2017 8:05 AM)   Narrative   COMPARISON:  None.        TECHNIQUE:  Noncontrast images were obtained through the chest.        FINDINGS: There is some mild dependent atelectasis. Lungs are otherwise clear. No pulmonary nodules present. There is a solitary focus of pleural thickening medially on the right on image 35 with some central calcification which is likely post inflammatory. There is no evidence of diffuse calcified pleural plaque to suggest prior asbestos exposure. The mediastinum is within normal limits with no lymphadenopathy. Coronary artery calcification noted. Visualized portions of the upper abdomen are unremarkable. No rib fractures or other significant osseous abnormalities are identified.           CT Chest WO IV Cont (09/01/2017 8:05 AM)   Procedure Note   Momo, Rad Results In - 09/01/2017 8:32 AM CDT    COMPARISON: None.    TECHNIQUE: Noncontrast images were obtained through the chest.    FINDINGS: There is some mild dependent atelectasis. Lungs are otherwise clear. No pulmonary nodules present. There is a solitary focus of pleural thickening medially on the right on image 35 with some central calcification which is likely post inflammatory. There is no evidence of diffuse calcified pleural plaque to suggest prior asbestos exposure. The mediastinum is within normal limits with no lymphadenopathy. Coronary artery calcification noted. Visualized portions of the upper abdomen are unremarkable. No rib fractures or other significant osseous abnormalities are identified.    IMPRESSION  IMPRESSION:     1. No etiology for left-sided chest wall pain identified.    2. Otherwise unremarkable chest CT. Incidental note of coronary artery calcification.       "  Back to top of Results      UA (Micro if Positive) (CLINIC) (08/24/2017 2:05 PM)  UA (Micro if Positive) (CLINIC) (08/24/2017 2:05 PM)   Component Value Ref Range   Urine Type URINE:clean cat     Turbidity Clear Clear   U BILI Negative Negative   Blood Urine Negative Neg - Trace   Glucose, Qualitative U Negative Neg-30 mg/dL   Ketones Negative Negative   Leukocyte Esterase Urine Negative Negative   Nitrite Urine Negative Negative   pH Urine 7.5 5.0 - 8.0   Protein Urine Negative Neg - Trace mg/dL   U Specific Gravity 1.010 1.005 - 1.030   Urobilinogen Urine Negative Negative Eu/dL     UA (Micro if Positive) (CLINIC) (08/24/2017 2:05 PM)   Specimen Performing Laboratory   Urine PN SOFT    [address] [place] [address]       UA (Micro if Positive) (CLINIC) (08/24/2017 2:05 PM)   Narrative   \"Performed at [facility], [address] [place] [address]  [address] CLIA# [id number]\"     Back to top of Results  Insurance  Payer Benefit Plan / Group Subscriber ID Type Phone Address   [employer] P[employer] PPO [id number] Commercial [phone] [id number]    [address] [place] [address]     MEDICARE MEDICARE MANAGED CARE BCBS [id number] Medicare [phone] ATTN CLAIMS    [address]    [facility] [place] [address]     BCBS BCBS PLATINUM BLUE [id number] Medicare   [address] [address]    [place] [address]       Guarantor Name Account Type Relation to Patient Date of Birth Phone Billing Address   MEGAN MIJARES Personal/Family Self 1950 Home: [phone] [address]    [place] [address]     MEGAN MIJARES Personal/Family Self 1950 Home: [phone] [address]    [place] [address]     MEGAN MIJARES Personal/Family Self 1950 Home: [phone] [address]    [place] [address]     Document Information  Primary Care Provider  Valerie L Saladin PA-C (Oct. 28, 2016 - Present) "   Document Coverage Dates  Aug. 22, 1950 - Nov. 01, 2017   Organization  [facility]  [phone] (Work)  [place] [address]      Again, thank you for allowing me to participate in the care of your patient.      Sincerely,    Jose Browning MD

## 2017-11-06 NOTE — NURSING NOTE
Los Parmar's goals for this visit include: consult  He requests these members of his care team be copied on today's visit information:     PCP: Peter, [facility]    Referring Provider:  [facility]  [address] [facility] [address] [address]  [place] [address]    Chief Complaint   Patient presents with     Consult       Initial /90  Pulse 56  Ht 1.829 m (6')  Wt 101.8 kg (224 lb 6.4 oz)  SpO2 97%  BMI 30.43 kg/m2 Estimated body mass index is 30.43 kg/(m^2) as calculated from the following:    Height as of this encounter: 1.829 m (6').    Weight as of this encounter: 101.8 kg (224 lb 6.4 oz).  Medication Reconciliation: complete    Do you need any medication refills at today's visit? n

## 2017-11-06 NOTE — MR AVS SNAPSHOT
After Visit Summary   11/6/2017    [place]    MRN: [id number]           Patient Information     Date Of Birth          1950        Visit Information        Provider Department      11/6/2017 1:00 PM Jose Browning MD [facility]        Today's Diagnoses     Abnormal involuntary movement    -  1    Orofacial dyskinesia          Care Instructions    Pharmacologic Therapy    The evidenced-based guidelines of the American Academy of Neurology recommend the use of clonazepam and ginkgo biloba for TD. [57, 58, 31]      Since these guidelines were last issued, the FDA has approved valbenazine (Ingrezza), the first drug to treat tardive dyskinesia. Valbenazine is a selective vesicular monoamine transporter 2 (VMAT2) inhibitor. These drugs modulate the presynaptic packaging and release of dopamine into the synapse, and may offset the movement-related effects of antipsychotics and other dopaminergic blockers. Approval was based on the KINECT 3 trial that included patients with schizophrenia, schizoaffective disorder, or a mood disorder who had moderate or severe tardive dyskinesia. Of the 225 participants, 205 completed the study. Based on the AIMS dyskinesia score, results showed valbenazine significantly improved tardive dyskinesia compared with placebo. [59]    Deutetrabenazine (Austedo) is a novel, highly selective vesicular monoamine transporter type 2 (VMAT2) inhibitor. FDA has approved deutetrabenazine tablets for the treatment of adults with tardive dyskinesia (TD). The approval was based in part on the results of two 12-week, randomized, double-blind, placebo-controlled, multicenter trials, which compared changes in involuntary movements in 335 patients with TD who took deutetrabenazine or placebo.   For the first trial, 222 patients were randomly assigned to deutetrabenazine (12 mg/day, 24 mg/day, 36 mg/day) or placebo. Two dosage levels--24 mg/day and 36  mg/day--were associated with statistically significant improvement in patients  Abnormal Involuntary Movement Scale (AIMS) score, from baseline to week 12, compared with placebo. [60]  For the second trial, 113 patients received daily doses of placebo or deutetrabenazine, starting at 12 mg/day, with titration up to 48 mg/day over a six-week period. This was followed by a six-week maintenance period at an average dose of 38.3 mg/day. From baseline to week 12, the AIMS total score decreased significantly in patients receiving deutetrabenazine compared with those receiving placebo. [61]    Kacey et al reported that clonazepam successfully alleviated the symptoms of TD and spontaneous oral dyskinesia. [62]  Clonazepam (1-4.5 mg/day) showed a decrease of TD symptoms by 35% compared with placebo in a 12-week trial (n=19). [63]    Primary prevention of TD by using the lowest effective dose of neuroleptic for the shortest period is recommended. When TD is diagnosed, reduce or discontinue the causative agent if possible. [33, 34, 42, 43, 41, 49, 35, 36, 37, 38] The risk of a permanent movement disorder must be weighed against the risks of exacerbating psychosis. In addition, TD may worsen initially after neuroleptics are discontinued.    Atypical neuroleptics may control psycho  In one study, increased uptake of the dopamine transporter (IVELISSE), verified by positron emission tomography (PET), followed the administration of quetiapine to a 66-year-old woman with amelioration of TD. [6]  In a group of adult men with TD, administration of branched-chain amino acids markedly reduced the movements of TD. [65] Such intervention is contraindicated in pregnant women. This promising treatment requires investigation in other locations to confirm its safety and efficacy.  Other therapeutic agents for which there is some anecdotal support include vitamin E, levodopa (see carbidopa/levodopa), benzodiazepines, botulinum toxin, reserpine,  tetrabenazine, propranolol, [66] and dopamine-depleting agents. Ondansetron, a selective 5-hydroxytryptamine-3 antagonist, has helped some individuals with TD. Discontinuance of anticholinergic therapy may relieve TD. A controversial strategy for treating TD is to continue or increase the dose of the dopamine antagonist.    For tardive tics, remove the causative neuroleptic if possible. If the patient cannot tolerate the absence of the neuroleptic, substitute an atypical neuroleptic. Pimozide, clonidine, and haloperidol can be helpful in some patients with tardive tics.    Clozapine has treated tardive tremor successfully. Propranolol is useful for tardive akathisia. Chanel and Shelly reported the occurrence of TD in a man treated with clozapine for 1 year who had previously received other dopamine antagonists. [64] The previous exposure to dopamine antagonists may have contributed to the development of TD in this patient.    Zopiclone, an agonist for the A receptor complex of gamma aminobutyric acid (MARIE), was reported to alleviate TD in 2 adults. [73]    Rosa Elena et al, in a preliminary open-label trial designed to examine the efficacy, tolerability, and safety of zonisamide against TD in psychiatric patients with TD associated with antipsychotic treatment, found that zonisamide appeared to be safe and effective in this setting; larger well-designed trials will be required to assess these findings further. [75]      Orofacial dyskinesias - onset with stress but movements persist despite improvement in stress. Mouth guard - lower mouth helps the symptoms. He has not been on medication for this but has tried magic mouthwash and has not taken pills or tried botox for this. No clear exposure to neuroleptic medication. Denies exposure to metoclopramide He has no family history of like condition. He has a brother Jeremi with parkinson disease (and  Had polio). He denies a family history of OCD, tics or learning disability  and he has not had these features.     Information provided in avs about treatment options.     Tardive movement problems - which he does not have but there is useful information below:  Https://www.aan.com/Guidelines/home/GetGuidelineContent/613  https://www.aan.com/Guidelines/home/GetGuidelineContent/614    Gingko biloba 80mg 3 times per day. Monitor for affects on anticoagulation and antidepressants    Clonazepam (klonopin)  Option  - in the benzodiazepine/valium type of medication.     Then there are 3 newer types of medications with risks and if interested he would probably need to visit with Tara Yoo, Annalisa D in neurology to review these medications.     TETRABENAZINE (NITOMAN )  Description and Uses  Tetrabenazine is used to treat movement disorders. Typically it is used for the treatment of hyperkinetic movement disorders such as Slade s disease and other conditions with chorea, hemiballismus, and tardive dyskinesia. It also may be employed for tic disorders including Gille s de la Tourette Syndrome. Hyperkinesias are characterized by excessive, involuntary purposeless and repetitive movements, which may involve the face, limbs or the entire body. Tetrabenazine works by depleting the brain.s supply of the neurochemical dopamine.  Dosage  12.5mg/day x 1 week; then 12.5mg twice daily x 1wk; then 25mg in am and 12.5mg in pm x 1wk then 25mg twice daily.  The dose is adjusted based on tolerance and responsiveness to the medication.  Side Effects  ? Depression  ? Slowed movements of hands, arms, legs or head similar to Parkinson.s disease.  ? Drowsiness or Sleepiness  ? Digestive Problems  ? Lowering of Blood Pressure  ? Nervousness or Anxiety  ? Insomnia  Contact your doctor immediately if you have:  ? Difficulty swallowing  ? Choking attacks  ? Stiffness or tightness in arms or legs  ? Confusion or irrational thoughts  Do not take this medication at the same time as:  ? Levodopa  ? Antidepressants and  MAO Inhibitors  ? Alcohol  Ordering Information  [facility]  [facility]  [address]  [address] [address]  [place]  Tel: [phone]  Fax: [phone]  Do not stop your medication without talking to your doctor  or a neurology resident on call.    Austedo (deutetrabenazine) dosed 6mg daily and can be increased in 6mg increments up to 48mg/day. 50% of patients reported improvement in their symptoms and 40% of providers observed improvement in their patients. This data is from patients with chorea in [place]'s [place]. There is an increased risk of suicidal idea and depression with this medication but patient denies any mood disorder. Side effects include sleepiness, diarrhea, tiredness, dry mouth, prolonged QT interval. This medication costs ~$4000 for #60 of the 6mg capsules.    Ingrezza (valbenazine) dosed 40mg daily for 1 week and then can increase to 80mg daily. 1 in 4 patients treated for 6 weeks on 80mg of Ingrezza have severity of symptoms reduced by >50%. Brief review of studies showed this data is from patients with TD due to exposure to neuroleptics. Side effects: somnolence, increased QT interval. Patient is not on any other medications that would contribute to prolonged QT interval. This is a specialty medication and likely would have to be filled through a specialty pharmacy. Cost ~$5000-10,000/month. [facility] could help us navigate coverage for the patient.    Consideration for botox if desired and would inject masseter/temporalis through neurology, PMR or/and ENT.    Continue to use the mouth guard and chewing gum to distract them from the involuntary movements, ie a sensory trick to address this.     Jose Browning MD                  Follow-ups after your visit        Additional Services     MED THERAPY MANAGE REFERRAL       Your provider has referred you to: Tara Yoo    Reason for Referral: dopamine  depletors    The [place] [facility] department will contact you to schedule an appointment.  You may also schedule the appointment by calling [phone].  For [place] patients, please call [phone] to confirm/schedule your appointment on the next business day.    This service is designed to help you get the most from your medications.  A specially trained Pharmacist will work closely with you and your providers to solve any questions, concerns, issues or problems related to your medications.    Please bring all of your prescription and non-prescription medications (such as vitamins, over-the-counter medications, and herbals) or a detailed medication list to your appointment.    If you have a glucose meter or other home monitoring information, please also bring this to your appointment (i.e. blood glucose log, blood pressure log, pain log, etc.).            NEUROLOGY ADULT REFERRAL       Your provider has referred you for the following:  Neurology for botox    Please be aware that coverage of these services is subject to the terms and limitations of your health insurance plan.  Call member services at your health plan with any benefit or coverage questions.      Please bring the following with you to your appointment:    (1) Any X-Rays, CTs or MRIs which have been performed.  Contact the facility where they were done to arrange for  prior to your scheduled appointment.    (2) List of current medications  (3) This referral request   (4) Any documents/labs given to you for this referral            OTOLARYNGOLOGY REFERRAL       Your provider has referred you to: eNT    Please be aware that coverage of these services is subject to the terms and limitations of your health insurance plan.  Call member services at your health plan with any benefit or coverage questions.      Please bring the following with you to your appointment:    (1) Any X-Rays, CTs or MRIs which have  been performed.  Contact the facility where they were done to arrange for  prior to your scheduled appointment.   (2) List of current medications  (3) This referral request   (4) Any documents/labs given to you for this referral            PHYSIATRY REFERRAL       Your provider has referred you to: PMR for botox    Please be aware that coverage of these services is subject to the terms and limitations of your health insurance plan.  Call member services at your health plan with any benefit or coverage questions.      Please bring the following to your appointment:  >>   Any x-rays, CTs or MRIs which have been performed.  Contact the facility where they were done to arrange for  prior to your scheduled appointment.    >>   List of current medications   >>   This referral request   >>   Any documents/labs given to you for this referral                  Who to contact     If you have questions or need follow up information about today's clinic visit or your schedule please contact [facility] directly at [phone].  Normal or non-critical lab and imaging results will be communicated to you by MyChart, letter or phone within 4 business days after the clinic has received the results. If you do not hear from us within 7 days, please contact the clinic through [employer] or phone. If you have a critical or abnormal lab result, we will notify you by phone as soon as possible.  Submit refill requests through [employer] or call your pharmacy and they will forward the refill request to us. Please allow 3 business days for your refill to be completed.          Additional Information About Your Visit        [employer] Information     [employer] gives you secure access to your electronic health record. If you see a primary care provider, you can also send messages to your care team and make appointments. If you have questions, please call your primary care clinic.  If you do not have a primary care provider,  please call [phone] and they will assist you.      [employer] is an electronic gateway that provides easy, online access to your medical records. With [employer], you can request a clinic appointment, read your test results, renew a prescription or communicate with your care team.     To access your existing account, please contact your [facility] or call [phone] for assistance.        Care EveryWhere ID     This is your Care EveryWhere ID. This could be used by other organizations to access your [facility] medical records  [id number]        Your Vitals Were     Pulse Height Pulse Oximetry BMI (Body Mass Index)          56 1.829 m (6') 97% 30.43 kg/m2         Blood Pressure from Last 3 Encounters:   11/06/17 147/90    Weight from Last 3 Encounters:   11/06/17 101.8 kg (224 lb 6.4 oz)              We Performed the Following     MED THERAPY MANAGE REFERRAL     NEUROLOGY ADULT REFERRAL     OTOLARYNGOLOGY REFERRAL     PHYSIATRY REFERRAL          Today's Medication Changes          These changes are accurate as of: 11/6/17  1:50 PM.  If you have any questions, ask your nurse or doctor.               Start taking these medicines.        Dose/Directions    omeprazole 20 MG tablet   Started by:  Jose Browning MD        Dose:  20 mg   Take 1 tablet (20 mg) by mouth daily as needed   Quantity:  30 tablet   Refills:  0         These medicines have changed or have updated prescriptions.        Dose/Directions    simvastatin 40 MG tablet   Commonly known as:  ZOCOR   This may have changed:  Another medication with the same name was removed. Continue taking this medication, and follow the directions you see here.   Changed by:  Jose Browning MD        Dose:  40 mg   Take 40 mg by mouth At Bedtime   Refills:  3                Primary Care Provider Office Phone # Fax #    [facility] [phone] [address]  [facility] [address]         Equal Access to Services     [facility] Center[facility] : Hadii aad ku hadstaciear Elenichandler, wamedda luqadaha, qaantionetteta jimenakarriedago tee. So [facility] [phone].    ATENCIÓN: Si habla español, tiene a fierro disposición servicios gratuitos de asistencia lingüística. Llame al [phone].    We comply with applicable federal civil rights laws and [place] laws. We do not discriminate on the basis of race, color, national origin, age, disability, sex, sexual orientation, or gender identity.            Thank you!     Thank you for choosing [facility]  for your care. Our goal is always to provide you with excellent care. Hearing back from our patients is one way we can continue to improve our services. Please take a few minutes to complete the written survey that you may receive in the mail after your visit with us. Thank you!             Your Updated Medication List - Protect others around you: Learn how to safely use, store and throw away your medicines at www.disposemymeds.org.          This list is accurate as of: 11/6/17  1:50 PM.  Always use your most recent med list.                   Brand Name Dispense Instructions for use Diagnosis    omeprazole 20 MG tablet     30 tablet    Take 1 tablet (20 mg) by mouth daily as needed        simvastatin 40 MG tablet    ZOCOR     Take 40 mg by mouth At Bedtime

## 2017-11-06 NOTE — PATIENT INSTRUCTIONS
Pharmacologic Therapy    The evidenced-based guidelines of the American Academy of Neurology recommend the use of clonazepam and ginkgo biloba for TD. [57, 58, 31]      Since these guidelines were last issued, the FDA has approved valbenazine (Ingrezza), the first drug to treat tardive dyskinesia. Valbenazine is a selective vesicular monoamine transporter 2 (VMAT2) inhibitor. These drugs modulate the presynaptic packaging and release of dopamine into the synapse, and may offset the movement-related effects of antipsychotics and other dopaminergic blockers. Approval was based on the KINECT 3 trial that included patients with schizophrenia, schizoaffective disorder, or a mood disorder who had moderate or severe tardive dyskinesia. Of the 225 participants, 205 completed the study. Based on the AIMS dyskinesia score, results showed valbenazine significantly improved tardive dyskinesia compared with placebo. [59]    Deutetrabenazine (Austedo) is a novel, highly selective vesicular monoamine transporter type 2 (VMAT2) inhibitor. FDA has approved deutetrabenazine tablets for the treatment of adults with tardive dyskinesia (TD). The approval was based in part on the results of two 12-week, randomized, double-blind, placebo-controlled, multicenter trials, which compared changes in involuntary movements in 335 patients with TD who took deutetrabenazine or placebo.   For the first trial, 222 patients were randomly assigned to deutetrabenazine (12 mg/day, 24 mg/day, 36 mg/day) or placebo. Two dosage levels--24 mg/day and 36 mg/day--were associated with statistically significant improvement in patients  Abnormal Involuntary Movement Scale (AIMS) score, from baseline to week 12, compared with placebo. [60]  For the second trial, 113 patients received daily doses of placebo or deutetrabenazine, starting at 12 mg/day, with titration up to 48 mg/day over a six-week period. This was followed by a six-week maintenance period at an  average dose of 38.3 mg/day. From baseline to week 12, the AIMS total score decreased significantly in patients receiving deutetrabenazine compared with those receiving placebo. [61]    Kacey et al reported that clonazepam successfully alleviated the symptoms of TD and spontaneous oral dyskinesia. [62]  Clonazepam (1-4.5 mg/day) showed a decrease of TD symptoms by 35% compared with placebo in a 12-week trial (n=19). [63]    Primary prevention of TD by using the lowest effective dose of neuroleptic for the shortest period is recommended. When TD is diagnosed, reduce or discontinue the causative agent if possible. [33, 34, 42, 43, 41, 49, 35, 36, 37, 38] The risk of a permanent movement disorder must be weighed against the risks of exacerbating psychosis. In addition, TD may worsen initially after neuroleptics are discontinued.    Atypical neuroleptics may control psycho  In one study, increased uptake of the dopamine transporter (IVELISSE), verified by positron emission tomography (PET), followed the administration of quetiapine to a 66-year-old woman with amelioration of TD. [6]  In a group of adult men with TD, administration of branched-chain amino acids markedly reduced the movements of TD. [65] Such intervention is contraindicated in pregnant women. This promising treatment requires investigation in other locations to confirm its safety and efficacy.  Other therapeutic agents for which there is some anecdotal support include vitamin E, levodopa (see carbidopa/levodopa), benzodiazepines, botulinum toxin, reserpine, tetrabenazine, propranolol, [66] and dopamine-depleting agents. Ondansetron, a selective 5-hydroxytryptamine-3 antagonist, has helped some individuals with TD. Discontinuance of anticholinergic therapy may relieve TD. A controversial strategy for treating TD is to continue or increase the dose of the dopamine antagonist.    For tardive tics, remove the causative neuroleptic if possible. If the patient  cannot tolerate the absence of the neuroleptic, substitute an atypical neuroleptic. Pimozide, clonidine, and haloperidol can be helpful in some patients with tardive tics.    Clozapine has treated tardive tremor successfully. Propranolol is useful for tardive akathisia. Chanel and Shelly reported the occurrence of TD in a man treated with clozapine for 1 year who had previously received other dopamine antagonists. [64] The previous exposure to dopamine antagonists may have contributed to the development of TD in this patient.    Zopiclone, an agonist for the A receptor complex of gamma aminobutyric acid (MARIE), was reported to alleviate TD in 2 adults. [73]    Rosa Elena et al, in a preliminary open-label trial designed to examine the efficacy, tolerability, and safety of zonisamide against TD in psychiatric patients with TD associated with antipsychotic treatment, found that zonisamide appeared to be safe and effective in this setting; larger well-designed trials will be required to assess these findings further. [75]      Orofacial dyskinesias - onset with stress but movements persist despite improvement in stress. Mouth guard - lower mouth helps the symptoms. He has not been on medication for this but has tried magic mouthwash and has not taken pills or tried botox for this. No clear exposure to neuroleptic medication. Denies exposure to metoclopramide He has no family history of like condition. He has a brother Jeremi with parkinson disease (and  Had polio). He denies a family history of OCD, tics or learning disability and he has not had these features.     Information provided in avs about treatment options.     Tardive movement problems - which he does not have but there is useful information below:  Https://www.aan.com/Guidelines/home/GetGuidelineContent/613  https://www.aan.com/Guidelines/home/GetGuidelineContent/614    Gingko biloba 80mg 3 times per day. Monitor for affects on anticoagulation and  antidepressants    Clonazepam (klonopin)  Option  - in the benzodiazepine/valium type of medication.     Then there are 3 newer types of medications with risks and if interested he would probably need to visit with Annalisa Giron D in neurology to review these medications.     TETRABENAZINE (NITOMAN )  Description and Uses  Tetrabenazine is used to treat movement disorders. Typically it is used for the treatment of hyperkinetic movement disorders such as Slade s disease and other conditions with chorea, hemiballismus, and tardive dyskinesia. It also may be employed for tic disorders including Gille s de la Tourette Syndrome. Hyperkinesias are characterized by excessive, involuntary purposeless and repetitive movements, which may involve the face, limbs or the entire body. Tetrabenazine works by depleting the brain.s supply of the neurochemical dopamine.  Dosage  12.5mg/day x 1 week; then 12.5mg twice daily x 1wk; then 25mg in am and 12.5mg in pm x 1wk then 25mg twice daily.  The dose is adjusted based on tolerance and responsiveness to the medication.  Side Effects  ? Depression  ? Slowed movements of hands, arms, legs or head similar to Parkinson.s disease.  ? Drowsiness or Sleepiness  ? Digestive Problems  ? Lowering of Blood Pressure  ? Nervousness or Anxiety  ? Insomnia  Contact your doctor immediately if you have:  ? Difficulty swallowing  ? Choking attacks  ? Stiffness or tightness in arms or legs  ? Confusion or irrational thoughts  Do not take this medication at the same time as:  ? Levodopa  ? Antidepressants and MAO Inhibitors  ? Alcohol  Ordering Information  [facility]  [facility] [address]  [facility]  Tel: [phone]  Fax: [phone]  Do not stop your medication without talking to your doctor  or a neurology resident on call.    Austedo (deutetrabenazine) dosed 6mg daily and can be  increased in 6mg increments up to 48mg/day. 50% of patients reported improvement in their symptoms and 40% of providers observed improvement in their patients. This data is from patients with chorea in Slade's Disease. There is an increased risk of suicidal idea and depression with this medication but patient denies any mood disorder. Side effects include sleepiness, diarrhea, tiredness, dry mouth, prolonged QT interval. This medication costs ~$4000 for #60 of the 6mg capsules.    Ingrezza (valbenazine) dosed 40mg daily for 1 week and then can increase to 80mg daily. 1 in 4 patients treated for 6 weeks on 80mg of Ingrezza have severity of symptoms reduced by >50%. Brief review of studies showed this data is from patients with TD due to exposure to neuroleptics. Side effects: somnolence, increased QT interval. Patient is not on any other medications that would contribute to prolonged QT interval. This is a specialty medication and likely would have to be filled through a specialty pharmacy. Cost ~$5000-10,000/month. [facility] could help us navigate coverage for the patient.    Consideration for botox if desired and would inject masseter/temporalis through neurology, PMR or/and ENT.    Continue to use the mouth guard and chewing gum to distract them from the involuntary movements, ie a sensory trick to address this.     Jose Browning MD

## 2017-11-07 ENCOUNTER — CARE COORDINATION (OUTPATIENT)
Dept: PHYSICAL MEDICINE AND REHAB | Facility: CLINIC | Age: 67
End: 2017-11-07

## 2017-11-07 NOTE — PROGRESS NOTES
I called Los Parmar to coordinate scheduling for an initial consultation with Dr. Farhat Newberry.  Mr. Parmar was referred to Dr. Newberry by Dr. Jose Browning, Movement Disorder Neurologist, [facility], for evaluation for possible use of the botulinum toxins for management of drew-facial dyskinesia.     At this time, Mr. Parmar has not made a decision regarding a trial of Botox.  He stated that he would like to consider other treatment options prior to a trial of Botox.  He does have my contact information and will call back if he decides he would like to schedule with Dr. Newberry.  Dr. Browning was notified of Mr. Parmar's decision.

## 2017-11-13 ENCOUNTER — ALLIED HEALTH/NURSE VISIT (OUTPATIENT)
Dept: PHARMACY | Facility: CLINIC | Age: 67
End: 2017-11-13
Payer: COMMERCIAL

## 2017-11-13 DIAGNOSIS — R73.03 PRE-DIABETES: ICD-10-CM

## 2017-11-13 DIAGNOSIS — G24.4 OROFACIAL DYSKINESIA: Primary | ICD-10-CM

## 2017-11-13 DIAGNOSIS — E78.2 MIXED HYPERLIPIDEMIA: ICD-10-CM

## 2017-11-13 DIAGNOSIS — K21.9 GASTROESOPHAGEAL REFLUX DISEASE, ESOPHAGITIS PRESENCE NOT SPECIFIED: ICD-10-CM

## 2017-11-13 DIAGNOSIS — K21.9 ESOPHAGEAL REFLUX: ICD-10-CM

## 2017-11-13 PROCEDURE — 99605 MTMS BY PHARM NP 15 MIN: CPT | Performed by: PHARMACIST

## 2017-11-13 PROCEDURE — 99607 MTMS BY PHARM ADDL 15 MIN: CPT | Performed by: PHARMACIST

## 2017-11-13 RX ORDER — GARLIC 180 MG
60 TABLET, DELAYED RELEASE (ENTERIC COATED) ORAL 3 TIMES DAILY
COMMUNITY
End: 2018-04-03

## 2017-11-13 NOTE — Clinical Note
FYI - we're going to try gingko for the orofacial dyskinesias. I don't think we'll be able to get valbenazine or deutetrabenazine covered without a diagnosis of TD (G24.01). He is willing to pusue botox if gingko doesn't help.

## 2017-11-13 NOTE — PATIENT INSTRUCTIONS
Recommendations from today's MTM visit:                                                    MTM (medication therapy management) is a service provided by a clinical pharmacist designed to help you get the most of out of your medicines.     1. Start taking gingko biloba 60 mg three times daily for the orofacial dsykinesias. You can purchase this over the counter at a pharmacy or drug store.    2. Based on your cholesterol levels in the fall of 2017, you should be on a stronger cholesterol medication. I recommend that your doctor switching the simvastatin to atorvastatin 40 mg daily. We can talk about this more the next time I call you in 3 weeks.    3. Your blood sugars were elevated in fall of 2017 too, so I would recommend more exercise and less carbohydrates in your diet to prevent progression to diabetes. We can also discuss this more the next time we talk.     Next MTM visit: Monday, December 4 at 2:00 pm (I will call you!)    To schedule another MTM appointment, please call the clinic directly or you may call the MTM scheduling line at [phone] or toll-free at [phone].     My Clinical Pharmacist's contact information:                                                      It was a pleasure seeing you today!  Please feel free to contact me with any questions or concerns you have.      Tara Yoo, Pharm.D.  Medication Therapy Management Resident  Phone: [phone]  Pager: [phone]    You may receive a survey about the MTM services you received.  I would appreciate your feedback to help me serve you better in the future. Please fill it out and return it when you can. Your comments will be anonymous.

## 2017-11-13 NOTE — MR AVS SNAPSHOT
After Visit Summary   11/13/2017    Los Parmar    MRN: [id number]           Patient Information     Date Of Birth          1950        Visit Information        Provider Department      11/13/2017 2:00 PM Tara Yoo [facility] MTM        Today's Diagnoses     Orofacial dyskinesia    -  1    Mixed hyperlipidemia        Esophageal reflux        Pre-diabetes        Gastroesophageal reflux disease, esophagitis presence not specified          Care Instructions    Recommendations from today's MTM visit:                                                    MTM (medication therapy management) is a service provided by a clinical pharmacist designed to help you get the most of out of your medicines.     1. Start taking gingko biloba 60 mg three times daily for the orofacial dsykinesias. You can purchase this over the counter at a pharmacy or drug store.    2. Based on your cholesterol levels in the fall of 2017, you should be on a stronger cholesterol medication. I recommend that your doctor switching the simvastatin to atorvastatin 40 mg daily. We can talk about this more the next time I call you in 3 weeks.    3. Your blood sugars were elevated in fall of 2017 too, so I would recommend more exercise and less carbohydrates in your diet to prevent progression to diabetes. We can also discuss this more the next time we talk.     Next MTM visit: Monday, December 4 at 2:00 pm (I will call you!)    To schedule another MTM appointment, please call the clinic directly or you may call the MTM scheduling line at [phone] or toll-free at [phone].     My Clinical Pharmacist's contact information:                                                      It was a pleasure seeing you today!  Please feel free to contact me with any questions or concerns you have.      Tara Yoo, Pharm.D.  Medication Therapy Management Resident  Phone: [phone]  Pager: [phone]    You may  receive a survey about the [facility] services you received.  I would appreciate your feedback to help me serve you better in the future. Please fill it out and return it when you can. Your comments will be anonymous.                  Follow-ups after your visit        Your next 10 appointments already scheduled     Dec 04, 2017  2:00 PM CST   TELEMEDICINE with Tara Yoo RPH   [facility]    [address] [facility]  3rd Floor  Clinic [address] [place] [id number] [phone]           Note: this is not an onsite visit; there is no need to come to the facility.              Who to contact     If you have questions or need follow up information about today's clinic visit or your schedule please contact [facility] directly at [phone].  Normal or non-critical lab and imaging results will be communicated to you by Bankofpokerhart, letter or phone within 4 business days after the clinic has received the results. If you do not hear from us within 7 days, please contact the clinic through Bankofpokerhart or phone. If you have a critical or abnormal lab result, we will notify you by phone as soon as possible.  Submit refill requests through J[employer] App or call your pharmacy and they will forward the refill request to us. Please allow 3 business days for your refill to be completed.          Additional Information About Your Visit        Bankofpokerhart Information     [employer] App gives you secure access to your electronic health record. If you see a primary care provider, you can also send messages to your care team and make appointments. If you have questions, please call your primary care clinic.  If you do not have a primary care provider, please call [phone] and they will assist you.        Care EveryWhere ID     This is your Care EveryWhere ID. This could be used by other organizations to access your [place] medical records  [id number]         Blood Pressure from  Last 3 Encounters:   11/06/17 147/90    Weight from Last 3 Encounters:   11/06/17 224 lb 6.4 oz (101.8 kg)              Today, you had the following     No orders found for display       Primary Care Provider Office Phone # Fax #    [facility] [phone] [address]  [facility] [address]        Equal Access to Services     DODIE MALHOTRA : Hadii aad ku hadasho Soomaali, waaxda luqadaha, qaybta kaalmada adeegyada, waxay idiin hayaan adeeg kharash la'aan ah. So [facility] [phone].    ATENCIÓN: Si habla español, tiene a fierro disposición servicios gratuitos de asistencia lingüística. Llame al [phone].    We comply with applicable federal civil rights laws and [place] laws. We do not discriminate on the basis of race, color, national origin, age, disability, sex, sexual orientation, or gender identity.            Thank you!     Thank you for choosing [facility]  for your care. Our goal is always to provide you with excellent care. Hearing back from our patients is one way we can continue to improve our services. Please take a few minutes to complete the written survey that you may receive in the mail after your visit with us. Thank you!             Your Updated Medication List - Protect others around you: Learn how to safely use, store and throw away your medicines at www.disposemymeds.org.          This list is accurate as of: 11/13/17 11:59 PM.  Always use your most recent med list.                   Brand Name Dispense Instructions for use Diagnosis    ginkgo biloba 60 MG Caps capsule      Take 60 mg by mouth 3 times daily        omeprazole 20 MG tablet     30 tablet    Take 1 tablet (20 mg) by mouth daily as needed        simvastatin 40 MG tablet    ZOCOR     Take 40 mg by mouth At Bedtime

## 2017-11-13 NOTE — PROGRESS NOTES
"SUBJECTIVE/OBJECTIVE:                           Los Parmar is a 67 year old male called for an initial visit for Medication Therapy Management.  He was referred to me from Dr. Browning.     Chief Complaint: Initial MTM visit, discuss pharmacotherapies to treat orofacial dyskinesias    Allergies/ADRs: none  Tobacco: No tobacco use  Alcohol: 1-3 bevarages / week  Caffeine: 4-5 cups/day of coffee  PMH: Reviewed in Epic    Medication Adherence/Access  Did not discuss number of missed doses of medication - patient only takes simvastatin every day.    Patient is responsible for his/her own medications.   Adherence/Compliance is described as excellent.  Medication barriers: no issues reported by patient.    Orofacial dyskinesias: Patient recently saw Dr. Browning in movement disorders for evaluation of involuntary movements of the mouth. Dr. Browning suspected this to be a dyskinesia, however patient does not appear to have had past neuropleptic exposure. He denies taking any antipsychotics, antidepressants, or metoclopramide - he denies taking clonazepam in the past, despite it being a historical medication in Epic. Patient reports that the movements \"just started one morning\" and he thought it was a gum or tooth problem. It typically presents as his tongue moving around in his mouth or like he's trying to get food out of his teeth. He denies lip smacking. He has been evaluated by multiple dentists and specialists. At one point it was thought to be TMJ so he went through PT, but then it was confirmed that it wasn't TMJ so he stopped PT. He was referred by a Head & Neck specialist to Dr. Browning. The only things that help are Tylenol and using a mouth guard. It seems to worsen after eating. The movements are somewhat bothersome to the patient and his wife often notices the movements despite the patient's unawareness. Patient feels the symptoms vary based on the day and there are some days when he \"doesn't care.\" He even states " that he is cognizant to control the symptoms sometimes.     Hyperlipidemia: Current therapy includes simvastatin 40 mg once daily.  Pt reports no significant myalgias or other side effects.     GERD: Current medications include: Prilosec (omeprazole) 20 mg occasionally (OTC). Patient feels that current regimen is effective.      Current labs include:  BP Readings from Last 3 Encounters:   11/06/17 147/90                     ASSESSMENT:                             Current medications were reviewed today.     Medication Adherence: no issues identified    Orofacial dyskinesias: Needs improvement. Unclear etiology of the involuntary movements, but did not seem to be caused by neuroleptic exposure (tardive dyskinesia), therefore may not be appropriate to treat with the expensive specialty dopamine depleting agents such as deutetrabenazine or valbenazine due to the limited evidence for their use so far outside of confirmed neuroleptic exposure. There is some limited evidence for gingko in tardive dyskinesia that Dr. Browning also recommended, and I agree that this would be a relatively harmless treatment option. Reviewed potential drug interactions with the patient, including antidepressants and anticoagulants and discussed appropriate dosing based on recommendations by the American Academy of Neurology. If patient does not find benefit with the gingko, he may benefit from botox injections, which are often used for various forms of dystonia.     Hyperlipidemia: Needs Improvement. Pt is on moderate intensity statin which is indicated based on 2013 ACC/AHA guidelines for lipid management.  Current 10-year ASCVD risk of 17.1%; however, it is unclear how this compares to an initial lipid measurement prior to initiating the statin. Based on this risk score, patient would benefit from high intensity statin.     GERD: Condition not fully assessed today, but patient appears satisfied with current therapy. Typically a PPI is not as  effective when used on-demand or as needed, so an H2RA may be a more appropriate therapy for this patient. Defer to PCP or next MTM visit.     Pre-diabetes: Based on previous A1c of 5.9%, patient likely has pre-diabetes. It is unclear whether the blood glucose level reported from 9/1/17 of 112 mg/dL is fasting or post-prandial. Patient would benefit from continued monitoring of these lab markers and lifestyle changes to improve blood glucose control.     PLAN:                            Recommendations to patient:  1. Start taking ginkgo biloba 80 mg three times daily (also recommended by Dr. Browning).     Recommendations to PCP:   1. Consider switching simvastatin to atorvastatin 40 mg daily.    Future considerations:   1. Discuss pre-diabetes and lifestyle management.   2. Discuss GERD control with prn omeprazole.     I spent 20 minutes with this patient today. I offer these suggestions for consideration by the PCP and movement disorder specialist. A copy of the visit note was provided to the patient's primary care provider (Valerie Saladin) and Dr. Browning.    Will follow up in 3 weeks: 12/4/17 at 2 pm.     The patient was sent via [employer] a summary of these recommendations as an after visit summary.     I concur with the note as dictated above which reflects our joint assessment and plan.   Cookie Dickinson, AnnalisaD    Tara Yoo, Pharm.D.  Medication Therapy Management Resident  Phone: [phone]  Pager: [phone]

## 2017-11-14 ENCOUNTER — MYC MEDICAL ADVICE (OUTPATIENT)
Dept: PHARMACY | Facility: CLINIC | Age: 67
End: 2017-11-14

## 2018-01-11 ENCOUNTER — ALLIED HEALTH/NURSE VISIT (OUTPATIENT)
Dept: PHARMACY | Facility: CLINIC | Age: 68
End: 2018-01-11
Payer: COMMERCIAL

## 2018-01-11 ENCOUNTER — TELEPHONE (OUTPATIENT)
Dept: PHARMACY | Facility: CLINIC | Age: 68
End: 2018-01-11

## 2018-01-11 DIAGNOSIS — G24.4 OROFACIAL DYSKINESIA: Primary | ICD-10-CM

## 2018-01-11 DIAGNOSIS — E78.2 MIXED HYPERLIPIDEMIA: ICD-10-CM

## 2018-01-11 PROCEDURE — 99605 MTMS BY PHARM NP 15 MIN: CPT | Performed by: PHARMACIST

## 2018-01-11 NOTE — MR AVS SNAPSHOT
After Visit Summary   1/11/2018    Los Parmar    MRN: [id number]           Patient Information     Date Of Birth          1950        Visit Information        Provider Department      1/11/2018 9:30 AM Tara Yoo RPH [facility] Neurology Clinic [facility]        Today's Diagnoses     Orofacial dyskinesia    -  1    Mixed hyperlipidemia           Follow-ups after your visit        Who to contact     If you have questions or need follow up information about today's clinic visit or your schedule please contact [facility] NEUROLOGY CLINIC [facility] directly at [phone].  Normal or non-critical lab and imaging results will be communicated to you by Carsabihart, letter or phone within 4 business days after the clinic has received the results. If you do not hear from us within 7 days, please contact the clinic through Carsabihart or phone. If you have a critical or abnormal lab result, we will notify you by phone as soon as possible.  Submit refill requests through SolAeroMed or call your pharmacy and they will forward the refill request to us. Please allow 3 business days for your refill to be completed.          Additional Information About Your Visit        MyChart Information     SolAeroMed gives you secure access to your electronic health record. If you see a primary care provider, you can also send messages to your care team and make appointments. If you have questions, please call your primary care clinic.  If you do not have a primary care provider, please call [phone] and they will assist you.        Care EveryWhere ID     This is your Care EveryWhere ID. This could be used by other organizations to access your [place] medical records  [id number]         Blood Pressure from Last 3 Encounters:   11/06/17 147/90    Weight from Last 3 Encounters:   11/06/17 224 lb 6.4 oz (101.8 kg)              Today, you had the following     No orders found for display       Primary Care Provider Office Phone #  Fax #    [facility] [phone]       [address]  [facility] [address]        Equal Access to Services     DODIE MALHOTRA : Sissy chin irmao Soronelali, waaxda luqadaha, qaybta kaalmada nimisha, dago tomas lashericeisidoro kelly. So [facility] [phone].    ATENCIÓN: Si habla español, tiene a fierro disposición servicios gratuitos de asistencia lingüística. Llame al [phone].    We comply with applicable federal civil rights laws and [place] laws. We do not discriminate on the basis of race, color, national origin, age, disability, sex, sexual orientation, or gender identity.            Thank you!     Thank you for choosing [facility]OLOG[facility] CLINIC [facility]  for your care. Our goal is always to provide you with excellent care. Hearing back from our patients is one way we can continue to improve our services. Please take a few minutes to complete the written survey that you may receive in the mail after your visit with us. Thank you!             Your Updated Medication List - Protect others around you: Learn how to safely use, store and throw away your medicines at www.disposemymeds.org.          This list is accurate as of: 1/11/18 12:56 PM.  Always use your most recent med list.                   Brand Name Dispense Instructions for use Diagnosis    ginkgo biloba 60 MG Caps capsule      Take 60 mg by mouth 3 times daily        omeprazole 20 MG tablet     30 tablet    Take 1 tablet (20 mg) by mouth daily as needed        simvastatin 40 MG tablet    ZOCOR     Take 40 mg by mouth At Bedtime

## 2018-01-11 NOTE — TELEPHONE ENCOUNTER
MTM pharmacist made outreach to patient for MTM follow up; see MTM note 1/11/18.     Tara Yoo, Pharm.D.  Medication Therapy Management Resident  Phone: [phone]  Pager: [phone]

## 2018-01-11 NOTE — PROGRESS NOTES
"SUBJECTIVE/OBJECTIVE:                Los Parmar is a 67 year old male called for a follow-up visit for Medication Therapy Management.  He was referred to me from Dr. Browning.     Chief Complaint: Follow up from our visit on 11/13/17.    Tobacco: No tobacco use  Alcohol: 1-3 beverages / week    Medication Adherence: no issues reported    Orofacial dyskinesias: Current medications include ginkgo biloba 80 mg three times daily. Patient has been taking this medication for about two months with no change in facial movements. He would like to stop taking this medication and is inquiring about other treatments. He had discussed Botox previously but patient is unwilling to try the Botox injections at this time.    Hyperlipidemia: Current therapy includes simvastatin 40 mg once daily.  Pt reports that he did not discuss MTM recommendation from last visit with primary care provider. He states that the statin dose was increased last year and he is not interested in taking a higher dose or different statin because he's \"not a medication person.\" MTM pharmacist explained his cardiovascular risks again and he is still unwilling to take a different statin.    Current labs include:BP Readings from Last 3 Encounters:   11/06/17 147/90         ASSESSMENT:              Current medications were reviewed today as discussed above.      Medication Adherence: no issues identified    Orofacial dyskinesias: Needs improvement. Pt is not receiving symptomatic relief from gingko, so he may benefit from discontinuing therapy. He will consider Botox and will reach out to neurology in the future if he decides to pursue this. As discussed previously, patient does not seem like an appropriate candidate for VMAT2 inhibitors since he has not had history of neuroleptic exposure.    Hyperlipidemia: Needs Improvement. Pt is on moderate intensity statin which is indicated based on 2013 ACC/AHA guidelines for lipid management.  Current 10-year ASCVD " risk is 17.1%; therefore, patient would benefit from high intensity statin. However, patient is not interested in making this change.       PLAN:                  1. Consider discontinuing gingko.   2. Consider discussing increased statin intensity with primary care provider.     I spent 15 minutes with this patient today. I offer these suggestions for consideration by the PCP and movement disorder specialist. A copy of the visit note was provided to the patient's primary care provider and movement disorder specialist.     Will follow up in one year or sooner if needed.    I concur with the note as dictated above which reflects our joint assessment and plan.   Cookie Dickinson, PharmD    The patient declined a summary of these recommendations as an after visit summary.    Tara Yoo, Annalisa.D.  Medication Therapy Management Resident  Phone: [phone]  Pager: [phone]

## 2018-01-11 NOTE — Clinical Note
ZULEIKAI - Patient has tried gingko for the past couple months and doesn't feel it's helped. He is not interested in pursuing Botox yet as the orofacial movements are not bothersome enough.

## 2018-01-30 ENCOUNTER — TELEPHONE (OUTPATIENT)
Dept: PHARMACY | Facility: CLINIC | Age: 68
End: 2018-01-30

## 2018-01-30 NOTE — TELEPHONE ENCOUNTER
Patient left message for [facility] pharmacist to discuss other treatment options for the involuntary mouth movements. He has taken gingko biloba for about 3 months and hasn't noticed any benefits. He is wondering if there are other treatments or if he should go back to the [place] [facility] who referred him to Dr. Browning initially. Patient is requesting a call back.    Tara Yoo, Pharm.D.  Medication Therapy Management Resident  Phone: [phone]  Pager: [phone]

## 2018-02-06 NOTE — TELEPHONE ENCOUNTER
Returned call to patient to discuss other medication options for the orofacial dyskinesias. We reviewed Dr. Browning's other recommendations from the consult on 11/6/17 including clonazepam, VMAT inhibitors, and Botox. Clonazepam is likely the only other oral medication that may provide benefit. The VMAT inhibitors would be used off label for this use since patient has not been on offending medications that would cause the movements. Botox is last option and patient is seemingly more interested in this option at this time. He will bring these recommendations back to the ENT for further discussion and management.    Tara Yoo, Pharm.D.  Medication Therapy Management Resident  Phone: [phone]  Pager: [phone]

## 2018-03-31 ENCOUNTER — TRANSFERRED RECORDS (OUTPATIENT)
Dept: HEALTH INFORMATION MANAGEMENT | Facility: CLINIC | Age: 68
End: 2018-03-31

## 2018-04-03 ENCOUNTER — TELEPHONE (OUTPATIENT)
Dept: PHARMACY | Facility: CLINIC | Age: 68
End: 2018-04-03

## 2018-04-03 ENCOUNTER — ALLIED HEALTH/NURSE VISIT (OUTPATIENT)
Dept: PHARMACY | Facility: CLINIC | Age: 68
End: 2018-04-03
Payer: COMMERCIAL

## 2018-04-03 DIAGNOSIS — G24.4 OROFACIAL DYSKINESIA: Primary | ICD-10-CM

## 2018-04-03 PROCEDURE — 99606 MTMS BY PHARM EST 15 MIN: CPT | Performed by: PHARMACIST

## 2018-04-03 RX ORDER — GABAPENTIN 300 MG/1
300 CAPSULE ORAL 3 TIMES DAILY
COMMUNITY

## 2018-04-03 NOTE — TELEPHONE ENCOUNTER
Patient left message for [facility] pharmacist and reports that he went to [place] [facility] for further evaluation of his orofacial dyskinesias. The provider in this clinic did not think he is a candidate for Botox and instead prescribed gabapentin. Patient is requesting a call back to discuss gabapentin since this was not a medication recommended by the neurology clinic.    Can return call to patient at [phone].     Tara Yoo, Pharm.D.  Medication Therapy Management Resident  Phone: [phone]  Pager: [phone]

## 2018-04-03 NOTE — TELEPHONE ENCOUNTER
See MTM encounter dated 4/3/18.    Tara Yoo, Pharm.D.  Medication Therapy Management Resident  Phone: [phone]  Pager: [phone]

## 2018-04-03 NOTE — PROGRESS NOTES
"SUBJECTIVE/OBJECTIVE:                Los Parmar is a 67 year old male called for a follow-up visit for Medication Therapy Management.  He was referred to me from Dr. Browning.     Chief Complaint: Follow up from our visit on 1/11/18  Tobacco: No tobacco use  Alcohol: 1-3 beverages / week    Medication Adherence/Access: no issues reported    Orofacial dyskinesis: Not currently taking any medications (he stopped taking ginkgo biloba because he found this ineffective. Patient reports that he returned to a specialist at the [facility] for evaluation and discussion of Botox. This provider told him that he is not a Botox candidate because he typically uses Botox for migraine headaches or involuntary twitches in arm/leg muscles. The provider prescribed gabapentin 300 mg three times daily and described that this medication may help with his \"nerve firing.\" Patient is willing to try this medication, but asked about side effects; the provider described that it might make him tired or \"flighty.\" Before starting the medication, he is going to see an endodontist. He was referred by a dentist specialist to the endodontist after the dentist found that numbing one side of his face took away the sensations/involuntary movements he was having. He'd like an evaluation by the endodontist regarding treatments that could affect the nerves prior to trying gabapentin.    Current labs include:  BP Readings from Last 3 Encounters:   11/06/17 147/90       ASSESSMENT:              Current medications were reviewed today as discussed above.      Medication Adherence: excellent, no issues identified    Orofacial dyskinesias: Needs improvement. Patient continues to see specialists and does not seem to have a clear diagnosis since these involuntary movements do not appear to be medication-induced. Prior to a trial of gabapentin he is going to receive another opinion from an endodontist; however, gabapentin may be a reasonable " medication to try after this appointment if no other options are presented. There are minimal studies showing that patients with tardive dyskinesia did find improvement in their involuntary movements with a decrease in AIMS score. It is unclear whether this data can be extrapolated to a patient without medication-induced dyskinesia. If there is a nerve-related cause of the movements, gabapentin seems to be a reasonable option with minimal side effects (somnolene, fatigue, edema).      PLAN:                  1. Patient to see endodontist for another opinion on his involuntary facial movements.   2. Could consider the trial of gabapentin (already prescribed by [facility]).    I spent 15 minutes with this patient today. I offer these suggestions for consideration by the movement disorders specialist. A copy of the visit note was provided to the movement disorders specialist.     Will follow up in 3 months or sooner if needed.    I concur with the note as dictated above which reflects our joint assessment and plan.   Cookie Dickinson PharmD    The patient declined a summary of these recommendations as an after visit summary.    Tara Yoo, Annalisa.D.  Medication Therapy Management Resident  Phone: [phone]  Pager: [phone]

## 2018-04-03 NOTE — MR AVS SNAPSHOT
After Visit Summary   4/3/2018    Los Parmar    MRN: [id number]           Patient Information     Date Of Birth          1950        Visit Information        Provider Department      4/3/2018 9:00 AM Tara Yoo [facility] Neurology Clinic [facility]        Today's Diagnoses     Orofacial dyskinesia    -  1       Follow-ups after your visit        Who to contact     If you have questions or need follow up information about today's clinic visit or your schedule please contact [facility] NEUROLOGY CLINIC [facility] directly at [phone].  Normal or non-critical lab and imaging results will be communicated to you by [email or web address], letter or phone within 4 business days after the clinic has received the results. If you do not hear from us within 7 days, please contact the clinic through ArtistForcet or phone. If you have a critical or abnormal lab result, we will notify you by phone as soon as possible.  Submit refill requests through Cirqle or call your pharmacy and they will forward the refill request to us. Please allow 3 business days for your refill to be completed.          Additional Information About Your Visit        MyChart Information     Cirqle gives you secure access to your electronic health record. If you see a primary care provider, you can also send messages to your care team and make appointments. If you have questions, please call your primary care clinic.  If you do not have a primary care provider, please call [phone] and they will assist you.        Care EveryWhere ID     This is your Care EveryWhere ID. This could be used by other organizations to access your [place] medical records  [id number]         Blood Pressure from Last 3 Encounters:   11/06/17 147/90    Weight from Last 3 Encounters:   11/06/17 224 lb 6.4 oz (101.8 kg)              Today, you had the following     No orders found for display       Primary Care Provider Office Phone # Fax #    [facility] [phone]       [address]  [facility] [address]        Equal Access to Services     DODIE MALHOTRA : Hadii aad ku haddennis Galo, alvada freedomkayleen, ricco kakarrieda nimisha, dago pedersenisidoro kelly. So [facility] [phone].    ATENCIÓN: Si habla español, tiene a fierro disposición servicios gratuitos de asistencia lingüística. Llame al [phone].    We comply with applicable federal civil rights laws and [place] laws. We do not discriminate on the basis of race, color, national origin, age, disability, sex, sexual orientation, or gender identity.            Thank you!     Thank you for choosing [facility] NEUROLOGY CLINIC [facility]  for your care. Our goal is always to provide you with excellent care. Hearing back from our patients is one way we can continue to improve our services. Please take a few minutes to complete the written survey that you may receive in the mail after your visit with us. Thank you!             Your Updated Medication List - Protect others around you: Learn how to safely use, store and throw away your medicines at www.disposemymeds.org.          This list is accurate as of 4/3/18 10:23 AM.  Always use your most recent med list.                   Brand Name Dispense Instructions for use Diagnosis    gabapentin 300 MG capsule    NEURONTIN     Take 300 mg by mouth 3 times daily        omeprazole 20 MG tablet     30 tablet    Take 1 tablet (20 mg) by mouth daily as needed        simvastatin 40 MG tablet    ZOCOR     Take 40 mg by mouth At Bedtime

## 2018-04-03 NOTE — Clinical Note
FYI - patient called to report that a head & neck specialist recommended he try gabapentin for the orofacial dyskinesias since he found no benefit with gingko biloba. He's going to see a endodonist first to see if there is a nerve cause and then will try gabapentin.

## 2019-10-14 ENCOUNTER — APPOINTMENT (OUTPATIENT)
Age: 69
Setting detail: DERMATOLOGY
End: 2019-10-17

## 2019-10-14 DIAGNOSIS — L57.8 OTHER SKIN CHANGES DUE TO CHRONIC EXPOSURE TO NONIONIZING RADIATION: ICD-10-CM

## 2019-10-14 DIAGNOSIS — L82.1 OTHER SEBORRHEIC KERATOSIS: ICD-10-CM

## 2019-10-14 DIAGNOSIS — L57.0 ACTINIC KERATOSIS: ICD-10-CM

## 2019-10-14 DIAGNOSIS — D49.2 NEOPLASM OF UNSPECIFIED BEHAVIOR OF BONE, SOFT TISSUE, AND SKIN: ICD-10-CM

## 2019-10-14 PROCEDURE — OTHER COUNSELING: OTHER

## 2019-10-14 PROCEDURE — 11102 TANGNTL BX SKIN SINGLE LES: CPT

## 2019-10-14 PROCEDURE — 17000 DESTRUCT PREMALG LESION: CPT | Mod: 59

## 2019-10-14 PROCEDURE — 11103 TANGNTL BX SKIN EA SEP/ADDL: CPT

## 2019-10-14 PROCEDURE — OTHER LIQUID NITROGEN: OTHER

## 2019-10-14 PROCEDURE — 99203 OFFICE O/P NEW LOW 30 MIN: CPT | Mod: 25

## 2019-10-14 PROCEDURE — 17003 DESTRUCT PREMALG LES 2-14: CPT

## 2019-10-14 PROCEDURE — OTHER BIOPSY BY SHAVE METHOD: OTHER

## 2019-10-14 ASSESSMENT — LOCATION SIMPLE DESCRIPTION DERM
LOCATION SIMPLE: RIGHT FOREHEAD
LOCATION SIMPLE: CHEST
LOCATION SIMPLE: LEFT FOREARM
LOCATION SIMPLE: SCALP
LOCATION SIMPLE: INFERIOR FOREHEAD
LOCATION SIMPLE: RIGHT UPPER BACK
LOCATION SIMPLE: RIGHT TEMPLE
LOCATION SIMPLE: NOSE
LOCATION SIMPLE: RIGHT FOREARM
LOCATION SIMPLE: LEFT FOREHEAD

## 2019-10-14 ASSESSMENT — LOCATION DETAILED DESCRIPTION DERM
LOCATION DETAILED: RIGHT MEDIAL FOREHEAD
LOCATION DETAILED: RIGHT MEDIAL UPPER BACK
LOCATION DETAILED: LEFT VENTRAL DISTAL FOREARM
LOCATION DETAILED: RIGHT VENTRAL DISTAL FOREARM
LOCATION DETAILED: RIGHT CENTRAL TEMPLE
LOCATION DETAILED: INFERIOR MID FOREHEAD
LOCATION DETAILED: LEFT CENTRAL PARIETAL SCALP
LOCATION DETAILED: STERNUM
LOCATION DETAILED: LEFT INFERIOR MEDIAL FOREHEAD
LOCATION DETAILED: RIGHT MEDIAL TEMPLE
LOCATION DETAILED: NASAL ROOT

## 2019-10-14 ASSESSMENT — LOCATION ZONE DERM
LOCATION ZONE: NOSE
LOCATION ZONE: FACE
LOCATION ZONE: ARM
LOCATION ZONE: TRUNK
LOCATION ZONE: SCALP

## 2019-10-14 NOTE — PROCEDURE: BIOPSY BY SHAVE METHOD
Curettage Text: The wound bed was treated with curettage after the biopsy was performed.
Was A Bandage Applied: Yes
X Size Of Lesion In Cm: 0
Wound Care: Petrolatum
Render Post-Care Instructions In Note?: no
Cryotherapy Text: The wound bed was treated with cryotherapy after the biopsy was performed.
Anesthesia Volume In Cc (Will Not Render If 0): 0.5
Notification Instructions: Patient will be notified of biopsy results. However, patient instructed to call the office if not contacted within 2 weeks.
Biopsy Method: Dermablade
Anesthesia Type: 1% lidocaine with epinephrine
Dressing: bandage
Type Of Destruction Used: Curettage
Body Location Override (Optional - Billing Will Still Be Based On Selected Body Map Location If Applicable): mid chest
Consent: Written consent was obtained and risks were reviewed including but not limited to scarring, infection, bleeding, scabbing, incomplete removal, nerve damage and allergy to anesthesia.
Hemostasis: Drysol
Billing Type: Third-Party Bill
Silver Nitrate Text: The wound bed was treated with silver nitrate after the biopsy was performed.
Biopsy Type: H and E
Consent: Verbal consent was obtained and risks were reviewed including but not limited to scarring, infection, bleeding, scabbing, incomplete removal, nerve damage and allergy to anesthesia.
Post-Care Instructions: I reviewed with the patient in detail post-care instructions. Patient is to keep the biopsy site dry overnight, and then apply bacitracin twice daily until healed. Patient may apply hydrogen peroxide soaks to remove any crusting.
Electrodesiccation Text: The wound bed was treated with electrodesiccation after the biopsy was performed.
Electrodesiccation And Curettage Text: The wound bed was treated with electrodesiccation and curettage after the biopsy was performed.
Detail Level: Detailed
Depth Of Biopsy: dermis

## 2019-11-11 ENCOUNTER — APPOINTMENT (OUTPATIENT)
Age: 69
Setting detail: DERMATOLOGY
End: 2019-11-11

## 2019-11-11 DIAGNOSIS — L57.8 OTHER SKIN CHANGES DUE TO CHRONIC EXPOSURE TO NONIONIZING RADIATION: ICD-10-CM

## 2019-11-11 DIAGNOSIS — L57.0 ACTINIC KERATOSIS: ICD-10-CM

## 2019-11-11 PROCEDURE — 17000 DESTRUCT PREMALG LESION: CPT

## 2019-11-11 PROCEDURE — 17003 DESTRUCT PREMALG LES 2-14: CPT

## 2019-11-11 PROCEDURE — 99213 OFFICE O/P EST LOW 20 MIN: CPT | Mod: 25

## 2019-11-11 PROCEDURE — OTHER COUNSELING: OTHER

## 2019-11-11 PROCEDURE — OTHER LIQUID NITROGEN: OTHER

## 2019-11-11 ASSESSMENT — LOCATION DETAILED DESCRIPTION DERM
LOCATION DETAILED: LEFT VENTRAL DISTAL FOREARM
LOCATION DETAILED: RIGHT MEDIAL TEMPLE
LOCATION DETAILED: RIGHT VENTRAL DISTAL FOREARM
LOCATION DETAILED: POSTERIOR MID-PARIETAL SCALP
LOCATION DETAILED: STERNAL NOTCH

## 2019-11-11 ASSESSMENT — LOCATION SIMPLE DESCRIPTION DERM
LOCATION SIMPLE: POSTERIOR SCALP
LOCATION SIMPLE: LEFT FOREARM
LOCATION SIMPLE: RIGHT TEMPLE
LOCATION SIMPLE: CHEST
LOCATION SIMPLE: RIGHT FOREARM

## 2019-11-11 ASSESSMENT — LOCATION ZONE DERM
LOCATION ZONE: FACE
LOCATION ZONE: SCALP
LOCATION ZONE: TRUNK
LOCATION ZONE: ARM

## 2019-11-11 NOTE — PROCEDURE: LIQUID NITROGEN
Render Note In Bullet Format When Appropriate: No
Consent: The patient's consent was obtained including but not limited to risks of crusting, scabbing, blistering, scarring, darker or lighter pigmentary change, recurrence, incomplete removal and infection.
Number Of Freeze-Thaw Cycles: 2 freeze-thaw cycles
Detail Level: Detailed
Post-Care Instructions: I reviewed with the patient in detail post-care instructions. Patient is to wear sunprotection, and avoid picking at any of the treated lesions. Pt may apply Vaseline to crusted or scabbing areas.
Duration Of Freeze Thaw-Cycle (Seconds): 10

## 2019-11-11 NOTE — HPI: SKIN LESION (ACTINIC KERATOSES)
How Severe Are They?: moderate
Is This A New Presentation, Or A Follow-Up?: Follow Up Actinic Keratoses
Additional History: Patient presents today for a follow up on his AK’s. He is needing cryotherapy done today.

## 2019-12-24 ENCOUNTER — APPOINTMENT (OUTPATIENT)
Dept: URBAN - METROPOLITAN AREA CLINIC 259 | Age: 69
Setting detail: DERMATOLOGY
End: 2022-04-05

## 2019-12-24 DIAGNOSIS — L57.0 ACTINIC KERATOSIS: ICD-10-CM

## 2019-12-24 PROCEDURE — OTHER PDT: BLUE: OTHER

## 2019-12-24 PROCEDURE — 96567 PDT DSTR PRMLG LES SKN: CPT

## 2019-12-24 ASSESSMENT — LOCATION DETAILED DESCRIPTION DERM
LOCATION DETAILED: RIGHT LOWER CUTANEOUS LIP
LOCATION DETAILED: RIGHT MEDIAL FOREHEAD

## 2019-12-24 ASSESSMENT — LOCATION ZONE DERM
LOCATION ZONE: FACE
LOCATION ZONE: LIP

## 2019-12-24 ASSESSMENT — LOCATION SIMPLE DESCRIPTION DERM
LOCATION SIMPLE: RIGHT FOREHEAD
LOCATION SIMPLE: RIGHT LIP

## 2019-12-24 NOTE — PROCEDURE: PDT: BLUE
Comments: Picture taken of a suspicious spot on the left frontal scalp. Patient was told to keep watch of any changes and follow up accordingly.
Number Of Kerasticks/Tubes Billed For: 1
Pre-Procedure Text: The treatment areas were cleaned and prepped in the usual fashion.
Detail Level: Zone
Medical Necessity: Precancerous Lesions
Total Number Of Aks Treated (Optional To Report): 0
Show Medical Necessity In Plan?: Yes
Who Performed The Pdt?: Performed by Nurse, MA or Aesthetician ([id number])
Post-Care Instructions: I reviewed with the patient in detail post-care instructions. Patient is to avoid sunlight for the next 2 days, and wear sun protection. Patients may expect sunburn like redness, discomfort and scabbing.
Light Source: Aly-U
Debridement Text (Will Only Render In Visit Note If You Select Debridement Option Under Who Performed The Pdt Field): Prior to application of the photodynamic medication the hyperkeratotic lesions were curetted to make them more amenable to therapy.
Which Photosensitizer Was Used: Ameluz
Was Levulan/Ameluz Applied On A Previous Day?: No
Illumination Time: 00:16:40
Incubation Time: 01:30:00
Anesthesia Type: 1% lidocaine with epinephrine
Consent: Written consent obtained.  The risks were reviewed with the patient including but not limited to: pigmentary changes, pain, blistering, scabbing, redness, and the remote possibility of scarring.

## 2020-03-10 ENCOUNTER — HEALTH MAINTENANCE LETTER (OUTPATIENT)
Age: 70
End: 2020-03-10

## 2020-05-21 ENCOUNTER — APPOINTMENT (OUTPATIENT)
Age: 70
Setting detail: DERMATOLOGY
End: 2020-05-22

## 2020-05-21 DIAGNOSIS — L57.0 ACTINIC KERATOSIS: ICD-10-CM

## 2020-05-21 DIAGNOSIS — L56.5 DISSEMINATED SUPERFICIAL ACTINIC POROKERATOSIS (DSAP): ICD-10-CM

## 2020-05-21 DIAGNOSIS — L72.0 EPIDERMAL CYST: ICD-10-CM

## 2020-05-21 PROCEDURE — OTHER COUNSELING: OTHER

## 2020-05-21 PROCEDURE — 99213 OFFICE O/P EST LOW 20 MIN: CPT | Mod: 25

## 2020-05-21 PROCEDURE — 17003 DESTRUCT PREMALG LES 2-14: CPT

## 2020-05-21 PROCEDURE — 17000 DESTRUCT PREMALG LESION: CPT

## 2020-05-21 PROCEDURE — 11900 INJECT SKIN LESIONS </W 7: CPT | Mod: 59

## 2020-05-21 PROCEDURE — OTHER LIQUID NITROGEN: OTHER

## 2020-05-21 PROCEDURE — OTHER INTRALESIONAL KENALOG: OTHER

## 2020-05-21 PROCEDURE — 10060 I&D ABSCESS SIMPLE/SINGLE: CPT

## 2020-05-21 PROCEDURE — OTHER INCISION AND DRAINAGE: OTHER

## 2020-05-21 ASSESSMENT — LOCATION SIMPLE DESCRIPTION DERM
LOCATION SIMPLE: RIGHT FOREARM
LOCATION SIMPLE: LEFT LIP
LOCATION SIMPLE: RIGHT CHEEK
LOCATION SIMPLE: RIGHT TEMPLE
LOCATION SIMPLE: RIGHT NOSE
LOCATION SIMPLE: RIGHT LIP
LOCATION SIMPLE: LEFT FOREHEAD
LOCATION SIMPLE: LEFT TEMPLE

## 2020-05-21 ASSESSMENT — LOCATION DETAILED DESCRIPTION DERM
LOCATION DETAILED: RIGHT MEDIAL MALAR CHEEK
LOCATION DETAILED: RIGHT SUPERIOR LATERAL MALAR CHEEK
LOCATION DETAILED: LEFT LOWER CUTANEOUS LIP
LOCATION DETAILED: LEFT MID TEMPLE
LOCATION DETAILED: RIGHT MID TEMPLE
LOCATION DETAILED: RIGHT NASAL SIDEWALL
LOCATION DETAILED: LEFT INFERIOR LATERAL FOREHEAD
LOCATION DETAILED: RIGHT LOWER CUTANEOUS LIP
LOCATION DETAILED: RIGHT VENTRAL PROXIMAL FOREARM

## 2020-05-21 ASSESSMENT — LOCATION ZONE DERM
LOCATION ZONE: FACE
LOCATION ZONE: LIP
LOCATION ZONE: NOSE
LOCATION ZONE: ARM

## 2020-05-21 NOTE — PROCEDURE: INCISION AND DRAINAGE
Preparation Text: The area was prepped in the usual clean fashion.
Detail Level: Detailed
Wound Care: Petrolatum
Suture Text: The incision was partially closed with
Curette Text (Optional): After the contents were expressed a curette was used to partially remove the cyst wall.
Consent was obtained and risks were reviewed including but not limited to delayed wound healing, infection, need for multiple I and D's, and pain.
Lesion Type: Abscess
Curette: No
Dressing: dry sterile dressing
Anesthesia Type: 1% lidocaine with epinephrine
Size Of Lesion In Cm (Optional But May Be Required For Some Insurances): 0
Method: 15 blade
Epidermal Sutures: 4-0 Ethilon
Epidermal Closure: simple interrupted
Post-Care Instructions: I reviewed with the patient in detail post-care instructions. Patient should keep wound covered and call the office should any redness, pain, swelling or worsening occur.

## 2020-05-21 NOTE — PROCEDURE: INTRALESIONAL KENALOG
Kenalog Preparation: Kenalog
Detail Level: Detailed
Size Of Lesion (Optional): 0.6
Consent: The risks of atrophy were reviewed with the patient.
Medical Necessity Clause: This procedure was medically necessary because the lesions that were treated were:
Total Volume Injected (Ccs- Only Use Numbers And Decimals): 0.2
X Size Of Lesion In Cm (Optional): 0
Concentration Of Solution Injected (Mg/Ml): 2.0
Include Z78.9 (Other Specified Conditions Influencing Health Status) As An Associated Diagnosis?: No

## 2020-05-21 NOTE — PROCEDURE: LIQUID NITROGEN
Render Note In Bullet Format When Appropriate: No
Post-Care Instructions: I reviewed with the patient in detail post-care instructions. Patient is to wear sunprotection, and avoid picking at any of the treated lesions. Pt may apply Vaseline to crusted or scabbing areas.
Consent: The patient's consent was obtained including but not limited to risks of crusting, scabbing, blistering, scarring, darker or lighter pigmentary change, recurrence, incomplete removal and infection.
Number Of Freeze-Thaw Cycles: 1 freeze-thaw cycle
Duration Of Freeze Thaw-Cycle (Seconds): 5
Detail Level: Detailed

## 2020-05-21 NOTE — PROCEDURE: COUNSELING
Doxycycline Pregnancy And Lactation Text: This medication is Pregnancy Category D and not consider safe during pregnancy. It is also excreted in breast milk but is considered safe for shorter treatment courses.
Spironolactone Counseling: Patient advised regarding risks of diarrhea, abdominal pain, hyperkalemia, birth defects (for female patients), liver toxicity and renal toxicity. The patient may need blood work to monitor liver and kidney function and potassium levels while on therapy. The patient verbalized understanding of the proper use and possible adverse effects of spironolactone.  All of the patient's questions and concerns were addressed.
Dapsone Pregnancy And Lactation Text: This medication is Pregnancy Category C and is not considered safe during pregnancy or breast feeding.
Topical Clindamycin Pregnancy And Lactation Text: This medication is Pregnancy Category B and is considered safe during pregnancy. It is unknown if it is excreted in breast milk.
Detail Level: Detailed
Include Pregnancy/Lactation Warning?: No
Isotretinoin Counseling: Patient should get monthly blood tests, not donate blood, not drive at night if vision affected, not share medication, and not undergo elective surgery for 6 months after tx completed. Side effects reviewed, pt to contact office should one occur.
Topical Sulfur Applications Pregnancy And Lactation Text: This medication is Pregnancy Category C and has an unknown safety profile during pregnancy. It is unknown if this topical medication is excreted in breast milk.
Patient Specific Counseling (Will Not Stick From Patient To Patient): Recommended that patient return in the fall for a PDT for his forehead and temples.
Minocycline Counseling: Patient advised regarding possible photosensitivity and discoloration of the teeth, skin, lips, tongue and gums.  Patient instructed to avoid sunlight, if possible.  When exposed to sunlight, patients should wear protective clothing, sunglasses, and sunscreen.  The patient was instructed to call the office immediately if the following severe adverse effects occur:  hearing changes, easy bruising/bleeding, severe headache, or vision changes.  The patient verbalized understanding of the proper use and possible adverse effects of minocycline.  All of the patient's questions and concerns were addressed.
Tetracycline Counseling: Patient counseled regarding possible photosensitivity and increased risk for sunburn.  Patient instructed to avoid sunlight, if possible.  When exposed to sunlight, patients should wear protective clothing, sunglasses, and sunscreen.  The patient was instructed to call the office immediately if the following severe adverse effects occur:  hearing changes, easy bruising/bleeding, severe headache, or vision changes.  The patient verbalized understanding of the proper use and possible adverse effects of tetracycline.  All of the patient's questions and concerns were addressed. Patient understands to avoid pregnancy while on therapy due to potential birth defects.
High Dose Vitamin A Pregnancy And Lactation Text: High dose vitamin A therapy is contraindicated during pregnancy and breast feeding.
Sarecycline Pregnancy And Lactation Text: This medication is Pregnancy Category D and not consider safe during pregnancy. It is also excreted in breast milk.
Doxycycline Counseling:  Patient counseled regarding possible photosensitivity and increased risk for sunburn.  Patient instructed to avoid sunlight, if possible.  When exposed to sunlight, patients should wear protective clothing, sunglasses, and sunscreen.  The patient was instructed to call the office immediately if the following severe adverse effects occur:  hearing changes, easy bruising/bleeding, severe headache, or vision changes.  The patient verbalized understanding of the proper use and possible adverse effects of doxycycline.  All of the patient's questions and concerns were addressed.
Birth Control Pills Counseling: Birth Control Pill Counseling: I discussed with the patient the potential side effects of OCPs including but not limited to increased risk of stroke, heart attack, thrombophlebitis, deep venous thrombosis, hepatic adenomas, breast changes, GI upset, headaches, and depression.  The patient verbalized understanding of the proper use and possible adverse effects of OCPs. All of the patient's questions and concerns were addressed.
Azithromycin Pregnancy And Lactation Text: This medication is considered safe during pregnancy and is also secreted in breast milk.
Detail Level: Zone
Sarecycline Counseling: Patient advised regarding possible photosensitivity and discoloration of the teeth, skin, lips, tongue and gums.  Patient instructed to avoid sunlight, if possible.  When exposed to sunlight, patients should wear protective clothing, sunglasses, and sunscreen.  The patient was instructed to call the office immediately if the following severe adverse effects occur:  hearing changes, easy bruising/bleeding, severe headache, or vision changes.  The patient verbalized understanding of the proper use and possible adverse effects of sarecycline.  All of the patient's questions and concerns were addressed.
Topical Clindamycin Counseling: Patient counseled that this medication may cause skin irritation or allergic reactions.  In the event of skin irritation, the patient was advised to reduce the amount of the drug applied or use it less frequently.   The patient verbalized understanding of the proper use and possible adverse effects of clindamycin.  All of the patient's questions and concerns were addressed.
Topical Retinoid counseling:  Patient advised to apply a pea-sized amount only at bedtime and wait 30 minutes after washing their face before applying.  If too drying, patient may add a non-comedogenic moisturizer. The patient verbalized understanding of the proper use and possible adverse effects of retinoids.  All of the patient's questions and concerns were addressed.
Isotretinoin Pregnancy And Lactation Text: This medication is Pregnancy Category X and is considered extremely dangerous during pregnancy. It is unknown if it is excreted in breast milk.
Azithromycin Counseling:  I discussed with the patient the risks of azithromycin including but not limited to GI upset, allergic reaction, drug rash, diarrhea, and yeast infections.
Benzoyl Peroxide Pregnancy And Lactation Text: This medication is Pregnancy Category C. It is unknown if benzoyl peroxide is excreted in breast milk.
Topical Sulfur Applications Counseling: Topical Sulfur Counseling: Patient counseled that this medication may cause skin irritation or allergic reactions.  In the event of skin irritation, the patient was advised to reduce the amount of the drug applied or use it less frequently.   The patient verbalized understanding of the proper use and possible adverse effects of topical sulfur application.  All of the patient's questions and concerns were addressed.
Bactrim Pregnancy And Lactation Text: This medication is Pregnancy Category D and is known to cause fetal risk.  It is also excreted in breast milk.
Birth Control Pills Pregnancy And Lactation Text: This medication should be avoided if pregnant and for the first 30 days post-partum.
Spironolactone Pregnancy And Lactation Text: This medication can cause feminization of the male fetus and should be avoided during pregnancy. The active metabolite is also found in breast milk.
Benzoyl Peroxide Counseling: Patient counseled that medicine may cause skin irritation and bleach clothing.  In the event of skin irritation, the patient was advised to reduce the amount of the drug applied or use it less frequently.   The patient verbalized understanding of the proper use and possible adverse effects of benzoyl peroxide.  All of the patient's questions and concerns were addressed.
Dapsone Counseling: I discussed with the patient the risks of dapsone including but not limited to hemolytic anemia, agranulocytosis, rashes, methemoglobinemia, kidney failure, peripheral neuropathy, headaches, GI upset, and liver toxicity.  Patients who start dapsone require monitoring including baseline LFTs and weekly CBCs for the first month, then every month thereafter.  The patient verbalized understanding of the proper use and possible adverse effects of dapsone.  All of the patient's questions and concerns were addressed.
Tazorac Pregnancy And Lactation Text: This medication is not safe during pregnancy. It is unknown if this medication is excreted in breast milk.
High Dose Vitamin A Counseling: Side effects reviewed, pt to contact office should one occur.
Erythromycin Counseling:  I discussed with the patient the risks of erythromycin including but not limited to GI upset, allergic reaction, drug rash, diarrhea, increase in liver enzymes, and yeast infections.
Erythromycin Pregnancy And Lactation Text: This medication is Pregnancy Category B and is considered safe during pregnancy. It is also excreted in breast milk.
Bactrim Counseling:  I discussed with the patient the risks of sulfa antibiotics including but not limited to GI upset, allergic reaction, drug rash, diarrhea, dizziness, photosensitivity, and yeast infections.  Rarely, more serious reactions can occur including but not limited to aplastic anemia, agranulocytosis, methemoglobinemia, blood dyscrasias, liver or kidney failure, lung infiltrates or desquamative/blistering drug rashes.
Tazorac Counseling:  Patient advised that medication is irritating and drying.  Patient may need to apply sparingly and wash off after an hour before eventually leaving it on overnight.  The patient verbalized understanding of the proper use and possible adverse effects of tazorac.  All of the patient's questions and concerns were addressed.
Topical Retinoid Pregnancy And Lactation Text: This medication is Pregnancy Category C. It is unknown if this medication is excreted in breast milk.

## 2020-07-23 ENCOUNTER — APPOINTMENT (OUTPATIENT)
Age: 70
Setting detail: DERMATOLOGY
End: 2020-07-23

## 2020-07-23 DIAGNOSIS — L57.0 ACTINIC KERATOSIS: ICD-10-CM

## 2020-07-23 PROCEDURE — OTHER PDT: BLUE: OTHER

## 2020-07-23 PROCEDURE — 96567 PDT DSTR PRMLG LES SKN: CPT

## 2020-07-23 PROCEDURE — OTHER PHOTODYNAMIC THERAPY COUNSELING: OTHER

## 2020-07-23 ASSESSMENT — LOCATION DETAILED DESCRIPTION DERM
LOCATION DETAILED: LEFT CENTRAL MALAR CHEEK
LOCATION DETAILED: RIGHT CENTRAL MALAR CHEEK
LOCATION DETAILED: LEFT MEDIAL FOREHEAD

## 2020-07-23 ASSESSMENT — LOCATION SIMPLE DESCRIPTION DERM
LOCATION SIMPLE: LEFT FOREHEAD
LOCATION SIMPLE: LEFT CHEEK
LOCATION SIMPLE: RIGHT CHEEK

## 2020-07-23 ASSESSMENT — LOCATION ZONE DERM: LOCATION ZONE: FACE

## 2020-07-23 NOTE — PROCEDURE: PHOTODYNAMIC THERAPY COUNSELING
Patient Specific Counseling (Will Not Stick From Patient To Patient): Patient was allowed to run home for some Tylenol during incubation time. Oked per EE
Detail Level: Zone

## 2020-07-23 NOTE — PROCEDURE: PDT: BLUE
Occlusion: No
Show Medical Necessity In Plan?: Yes
Incubation Time: 01:00:00
Medical Necessity: Precancerous Lesions
Illumination Time: 00:16:40
Which Photosensitizer Was Used: Ameluz
Post-Care Instructions: I reviewed with the patient in detail post-care instructions. Patient is to avoid sunlight for the next 2 days, and wear sun protection. Patients may expect sunburn like redness, discomfort and scabbing.
Treatment Number: 0
Detail Level: Zone
Who Performed The Pdt?: Performed by Nurse, MA or Aesthetician ([id number])
Consent: Written consent obtained.  The risks were reviewed with the patient including but not limited to: pigmentary changes, pain, blistering, scabbing, redness, and the remote possibility of scarring.
Anesthesia Type: 1% lidocaine with epinephrine
Number Of Kerasticks/Tubes Billed For: 1
Debridement Text (Will Only Render In Visit Note If You Select Debridement Option Under Who Performed The Pdt Field): Patient was curetted as prep.
Light Source: Aly-U
Pre-Procedure Text: The treatment areas were cleaned and prepped in the usual fashion.

## 2020-12-27 ENCOUNTER — HEALTH MAINTENANCE LETTER (OUTPATIENT)
Age: 70
End: 2020-12-27

## 2021-04-24 ENCOUNTER — HEALTH MAINTENANCE LETTER (OUTPATIENT)
Age: 71
End: 2021-04-24

## 2021-07-20 ENCOUNTER — APPOINTMENT (OUTPATIENT)
Dept: URBAN - METROPOLITAN AREA CLINIC 259 | Age: 71
Setting detail: DERMATOLOGY
End: 2021-07-21

## 2021-07-20 DIAGNOSIS — L57.8 OTHER SKIN CHANGES DUE TO CHRONIC EXPOSURE TO NONIONIZING RADIATION: ICD-10-CM

## 2021-07-20 DIAGNOSIS — D22 MELANOCYTIC NEVI: ICD-10-CM

## 2021-07-20 DIAGNOSIS — L57.0 ACTINIC KERATOSIS: ICD-10-CM

## 2021-07-20 DIAGNOSIS — L82.1 OTHER SEBORRHEIC KERATOSIS: ICD-10-CM

## 2021-07-20 PROBLEM — D22.5 MELANOCYTIC NEVI OF TRUNK: Status: ACTIVE | Noted: 2021-07-20

## 2021-07-20 PROCEDURE — OTHER MIPS QUALITY: OTHER

## 2021-07-20 PROCEDURE — 17000 DESTRUCT PREMALG LESION: CPT

## 2021-07-20 PROCEDURE — OTHER LIQUID NITROGEN: OTHER

## 2021-07-20 PROCEDURE — 99213 OFFICE O/P EST LOW 20 MIN: CPT | Mod: 25

## 2021-07-20 PROCEDURE — 17003 DESTRUCT PREMALG LES 2-14: CPT

## 2021-07-20 PROCEDURE — OTHER COUNSELING: OTHER

## 2021-07-20 ASSESSMENT — LOCATION DETAILED DESCRIPTION DERM
LOCATION DETAILED: RIGHT SUPERIOR LATERAL MALAR CHEEK
LOCATION DETAILED: POSTERIOR MID-PARIETAL SCALP
LOCATION DETAILED: RIGHT SUPERIOR FOREHEAD
LOCATION DETAILED: RIGHT SUPERIOR UPPER BACK
LOCATION DETAILED: RIGHT CENTRAL FRONTAL SCALP
LOCATION DETAILED: LEFT INFERIOR CENTRAL MALAR CHEEK
LOCATION DETAILED: LEFT INFERIOR LATERAL FOREHEAD
LOCATION DETAILED: RIGHT MID TEMPLE
LOCATION DETAILED: LEFT LATERAL FOREHEAD
LOCATION DETAILED: LEFT SUPERIOR FOREHEAD
LOCATION DETAILED: RIGHT MID-UPPER BACK
LOCATION DETAILED: LEFT INFERIOR TEMPLE
LOCATION DETAILED: MID-OCCIPITAL SCALP

## 2021-07-20 ASSESSMENT — LOCATION SIMPLE DESCRIPTION DERM
LOCATION SIMPLE: RIGHT TEMPLE
LOCATION SIMPLE: RIGHT CHEEK
LOCATION SIMPLE: LEFT CHEEK
LOCATION SIMPLE: RIGHT UPPER BACK
LOCATION SIMPLE: POSTERIOR SCALP
LOCATION SIMPLE: LEFT FOREHEAD
LOCATION SIMPLE: RIGHT FOREHEAD
LOCATION SIMPLE: LEFT TEMPLE
LOCATION SIMPLE: RIGHT SCALP

## 2021-07-20 ASSESSMENT — LOCATION ZONE DERM
LOCATION ZONE: TRUNK
LOCATION ZONE: FACE
LOCATION ZONE: SCALP

## 2021-07-20 NOTE — PROCEDURE: LIQUID NITROGEN
Show Applicator Variable?: Yes
Detail Level: Simple
Consent: The patient's consent was obtained including but not limited to risks of crusting, scabbing, blistering, scarring, darker or lighter pigmentary change, recurrence, incomplete removal and infection.
Render Note In Bullet Format When Appropriate: No
Post-Care Instructions: I reviewed with the patient in detail post-care instructions. Patient is to wear sunprotection, and avoid picking at any of the treated lesions. Pt may apply Vaseline to crusted or scabbing areas.
Number Of Freeze-Thaw Cycles: 1 freeze-thaw cycle
Duration Of Freeze Thaw-Cycle (Seconds): 0

## 2021-07-20 NOTE — PROCEDURE: MIPS QUALITY
Quality 110: Preventive Care And Screening: Influenza Immunization: Influenza Immunization Ordered or Recommended, but not Administered due to system reason
Quality 226: Preventive Care And Screening: Tobacco Use: Screening And Cessation Intervention: Patient screened for tobacco use and is an ex/non-smoker
Detail Level: Detailed
Quality 431: Preventive Care And Screening: Unhealthy Alcohol Use - Screening: Patient screened for unhealthy alcohol use using a single question and scores less than 2 times per year
Quality 130: Documentation Of Current Medications In The Medical Record: Current Medications Documented

## 2021-10-04 ENCOUNTER — HEALTH MAINTENANCE LETTER (OUTPATIENT)
Age: 71
End: 2021-10-04

## 2022-02-23 ENCOUNTER — APPOINTMENT (OUTPATIENT)
Dept: URBAN - METROPOLITAN AREA CLINIC 257 | Age: 72
Setting detail: DERMATOLOGY
End: 2022-03-01

## 2022-02-23 DIAGNOSIS — D485 NEOPLASM OF UNCERTAIN BEHAVIOR OF SKIN: ICD-10-CM

## 2022-02-23 DIAGNOSIS — L81.4 OTHER MELANIN HYPERPIGMENTATION: ICD-10-CM

## 2022-02-23 DIAGNOSIS — L57.0 ACTINIC KERATOSIS: ICD-10-CM

## 2022-02-23 DIAGNOSIS — L82.1 OTHER SEBORRHEIC KERATOSIS: ICD-10-CM

## 2022-02-23 PROBLEM — D48.5 NEOPLASM OF UNCERTAIN BEHAVIOR OF SKIN: Status: ACTIVE | Noted: 2022-02-23

## 2022-02-23 PROCEDURE — 17003 DESTRUCT PREMALG LES 2-14: CPT

## 2022-02-23 PROCEDURE — OTHER LIQUID NITROGEN: OTHER

## 2022-02-23 PROCEDURE — 99213 OFFICE O/P EST LOW 20 MIN: CPT | Mod: 25

## 2022-02-23 PROCEDURE — OTHER PRESCRIPTION: OTHER

## 2022-02-23 PROCEDURE — OTHER COUNSELING: OTHER

## 2022-02-23 PROCEDURE — OTHER MIPS QUALITY: OTHER

## 2022-02-23 PROCEDURE — OTHER SHAVE REMOVAL: OTHER

## 2022-02-23 PROCEDURE — 17000 DESTRUCT PREMALG LESION: CPT | Mod: 59

## 2022-02-23 PROCEDURE — 11301 SHAVE SKIN LESION 0.6-1.0 CM: CPT

## 2022-02-23 RX ORDER — HYDROQUINONE 40 MG/G
CREAM TOPICAL
Qty: 1 | Refills: 3 | Status: ERX | COMMUNITY
Start: 2022-02-23

## 2022-02-23 ASSESSMENT — LOCATION SIMPLE DESCRIPTION DERM
LOCATION SIMPLE: LEFT CHEEK
LOCATION SIMPLE: RIGHT UPPER BACK
LOCATION SIMPLE: CHEST

## 2022-02-23 ASSESSMENT — LOCATION DETAILED DESCRIPTION DERM
LOCATION DETAILED: LEFT MEDIAL INFERIOR CHEST
LOCATION DETAILED: RIGHT SUPERIOR MEDIAL UPPER BACK
LOCATION DETAILED: STERNUM
LOCATION DETAILED: LEFT CENTRAL MALAR CHEEK
LOCATION DETAILED: LEFT INFERIOR LATERAL MALAR CHEEK

## 2022-02-23 ASSESSMENT — LOCATION ZONE DERM
LOCATION ZONE: TRUNK
LOCATION ZONE: FACE

## 2022-02-23 NOTE — PROCEDURE: COUNSELING
Detail Level: Zone
Detail Level: Detailed
Patient Specific Counseling (Will Not Stick From Patient To Patient): Discussed PDT treatment with the patient as a good treatment to treat the actinic keratoses on his face. It was recommended he do that procedure again. Otherwise, liquid nitrogen was discussed. Lastly, effudex was discussed as a treatment option too. Patient declined PDT treatment and effudex, but opted for liquid nitrogen treatment.\\n\\nIf patient wants to call and schedule an appointment for PDT for his face he can.

## 2022-02-23 NOTE — PROCEDURE: SHAVE REMOVAL
Medical Necessity Clause: This procedure was medically necessary because the lesion that was treated was:
Consent was obtained from the patient. The risks and benefits to therapy were discussed in detail. Specifically, the risks of infection, scarring, bleeding, prolonged wound healing, incomplete removal, allergy to anesthesia, nerve injury and recurrence were addressed. Prior to the procedure, the treatment site was clearly identified and confirmed by the patient. All components of Universal Protocol/PAUSE Rule completed.
Was A Bandage Applied: Yes
Notification Instructions: Patient will be notified of pathology results. However, patient instructed to call the office if not contacted within 2 weeks.
X Size Of Lesion In Cm (Optional): 0
Bill For Surgical Tray: no
Medical Necessity Information: It is in your best interest to select a reason for this procedure from the list below. All of these items fulfill various CMS LCD requirements except the new and changing color options.
Anesthesia Type: 1% lidocaine with epinephrine
Post-Care Instructions: I reviewed with the patient in detail post-care instructions. Patient is to keep the biopsy site dry overnight, and then apply bacitracin twice daily until healed. Patient may apply hydrogen peroxide soaks to remove any crusting.
Size Of Lesion In Cm (Required): 0.6
Hemostasis: Drysol
Detail Level: Detailed
Biopsy Method: Dermablade
Billing Type: Third-Party Bill
Wound Care: Petrolatum

## 2022-02-23 NOTE — PROCEDURE: LIQUID NITROGEN
Consent: The patient's consent was obtained including but not limited to risks of crusting, scabbing, blistering, scarring, darker or lighter pigmentary change, recurrence, incomplete removal and infection.
Render Post-Care Instructions In Note?: yes
Post-Care Instructions: I reviewed with the patient in detail post-care instructions. Patient is to wear sunprotection, and avoid picking at any of the treated lesions. Pt may apply Vaseline to crusted or scabbing areas.
Render In Bullet Format When Appropriate: No
Detail Level: Zone
Duration Of Freeze Thaw-Cycle (Seconds): 0
Total Number Of Aks Treated: 12

## 2022-02-23 NOTE — HPI: SKIN LESIONS
How Severe Is Your Skin Lesion?: mild
Have Your Skin Lesions Been Treated?: not been treated
Is This A New Presentation, Or A Follow-Up?: Skin Lesions
Additional History: Patient has some spots on his chest, back, and face that he wants looked at today. They are not bothersome, but wants reassurance.

## 2022-04-27 ENCOUNTER — APPOINTMENT (OUTPATIENT)
Dept: URBAN - METROPOLITAN AREA CLINIC 259 | Age: 72
Setting detail: DERMATOLOGY
End: 2022-04-27

## 2022-04-27 DIAGNOSIS — D485 NEOPLASM OF UNCERTAIN BEHAVIOR OF SKIN: ICD-10-CM

## 2022-04-27 DIAGNOSIS — L57.0 ACTINIC KERATOSIS: ICD-10-CM

## 2022-04-27 DIAGNOSIS — L57.8 OTHER SKIN CHANGES DUE TO CHRONIC EXPOSURE TO NONIONIZING RADIATION: ICD-10-CM

## 2022-04-27 PROBLEM — D48.5 NEOPLASM OF UNCERTAIN BEHAVIOR OF SKIN: Status: ACTIVE | Noted: 2022-04-27

## 2022-04-27 PROCEDURE — 99213 OFFICE O/P EST LOW 20 MIN: CPT | Mod: 25

## 2022-04-27 PROCEDURE — 11102 TANGNTL BX SKIN SINGLE LES: CPT

## 2022-04-27 PROCEDURE — OTHER COUNSELING: OTHER

## 2022-04-27 PROCEDURE — OTHER BIOPSY BY SHAVE METHOD: OTHER

## 2022-04-27 ASSESSMENT — LOCATION DETAILED DESCRIPTION DERM
LOCATION DETAILED: NASAL DORSUM
LOCATION DETAILED: LEFT CENTRAL MALAR CHEEK
LOCATION DETAILED: RIGHT LATERAL MALAR CHEEK

## 2022-04-27 ASSESSMENT — LOCATION SIMPLE DESCRIPTION DERM
LOCATION SIMPLE: RIGHT CHEEK
LOCATION SIMPLE: LEFT CHEEK
LOCATION SIMPLE: NOSE

## 2022-04-27 ASSESSMENT — LOCATION ZONE DERM
LOCATION ZONE: NOSE
LOCATION ZONE: FACE

## 2022-04-27 NOTE — PROCEDURE: COUNSELING
Patient Specific Counseling (Will Not Stick From Patient To Patient): \\nPatient declined treatment today, he states he had them treated at his last visit and will follow up in a few months.
Detail Level: Zone
Detail Level: Simple

## 2022-04-27 NOTE — PROCEDURE: BIOPSY BY SHAVE METHOD
Hide Additional Size Dimension?: No
Information: Selecting Yes will display possible errors in your note based on the variables you have selected. This validation is only offered as a suggestion for you. PLEASE NOTE THAT THE VALIDATION TEXT WILL BE REMOVED WHEN YOU FINALIZE YOUR NOTE. IF YOU WANT TO FAX A PRELIMINARY NOTE YOU WILL NEED TO TOGGLE THIS TO 'NO' IF YOU DO NOT WANT IT IN YOUR FAXED NOTE.
Additional Anesthesia Volume In Cc (Will Not Render If 0): 0
Depth Of Biopsy: dermis
Billing Type: Third-Party Bill
Anesthesia Volume In Cc (Will Not Render If 0): 0.5
Hemostasis: Drysol
Silver Nitrate Text: The wound bed was treated with silver nitrate after the biopsy was performed.
Electrodesiccation And Curettage Text: The wound bed was treated with electrodesiccation and curettage after the biopsy was performed.
Was A Bandage Applied: Yes
Electrodesiccation Text: The wound bed was treated with electrodesiccation after the biopsy was performed.
Biopsy Type: H and E
Wound Care: Petrolatum
Type Of Destruction Used: Curettage
Notification Instructions: Patient will be notified of biopsy results. However, patient instructed to call the office if not contacted within 2 weeks.
Consent: Written consent was obtained and risks were reviewed including but not limited to scarring, infection, bleeding, scabbing, incomplete removal, nerve damage and allergy to anesthesia.
Cryotherapy Text: The wound bed was treated with cryotherapy after the biopsy was performed.
Detail Level: Detailed
Post-Care Instructions: I reviewed with the patient in detail post-care instructions. Patient is to keep the biopsy site dry overnight, and then apply bacitracin twice daily until healed. Patient may apply hydrogen peroxide soaks to remove any crusting.
Biopsy Method: double edge Personna blade
Dressing: bandage
Anesthesia Type: 1% lidocaine with epinephrine
Curettage Text: The wound bed was treated with curettage after the biopsy was performed.

## 2022-05-15 ENCOUNTER — HEALTH MAINTENANCE LETTER (OUTPATIENT)
Age: 72
End: 2022-05-15

## 2022-07-06 ENCOUNTER — APPOINTMENT (OUTPATIENT)
Dept: URBAN - METROPOLITAN AREA CLINIC 257 | Age: 72
Setting detail: DERMATOLOGY
End: 2022-07-07

## 2022-07-06 DIAGNOSIS — L57.0 ACTINIC KERATOSIS: ICD-10-CM

## 2022-07-06 PROCEDURE — OTHER COUNSELING: OTHER

## 2022-07-06 PROCEDURE — OTHER MIPS QUALITY: OTHER

## 2022-07-06 PROCEDURE — 17003 DESTRUCT PREMALG LES 2-14: CPT

## 2022-07-06 PROCEDURE — 17000 DESTRUCT PREMALG LESION: CPT

## 2022-07-06 PROCEDURE — OTHER LIQUID NITROGEN: OTHER

## 2022-07-06 ASSESSMENT — LOCATION SIMPLE DESCRIPTION DERM
LOCATION SIMPLE: SCALP
LOCATION SIMPLE: LEFT SCALP
LOCATION SIMPLE: RIGHT SCALP
LOCATION SIMPLE: NOSE

## 2022-07-06 ASSESSMENT — LOCATION DETAILED DESCRIPTION DERM
LOCATION DETAILED: RIGHT CENTRAL FRONTAL SCALP
LOCATION DETAILED: RIGHT SUPERIOR PARIETAL SCALP
LOCATION DETAILED: LEFT SUPERIOR PARIETAL SCALP
LOCATION DETAILED: NASAL DORSUM
LOCATION DETAILED: LEFT CENTRAL FRONTAL SCALP

## 2022-07-06 ASSESSMENT — LOCATION ZONE DERM
LOCATION ZONE: NOSE
LOCATION ZONE: SCALP

## 2022-07-06 NOTE — PROCEDURE: LIQUID NITROGEN
Post-Care Instructions: I reviewed with the patient in detail post-care instructions. Patient is to wear sunprotection, and avoid picking at any of the treated lesions. Pt may apply Vaseline to crusted or scabbing areas.
Render In Bullet Format When Appropriate: No
Duration Of Freeze Thaw-Cycle (Seconds): 5
Detail Level: Detailed
Number Of Freeze-Thaw Cycles: 2 freeze-thaw cycles
Consent: The patient's consent was obtained including but not limited to risks of crusting, scabbing, blistering, scarring, darker or lighter pigmentary change, recurrence, incomplete removal and infection.
Render Post-Care Instructions In Note?: yes

## 2022-09-11 ENCOUNTER — HEALTH MAINTENANCE LETTER (OUTPATIENT)
Age: 72
End: 2022-09-11

## 2023-03-07 ENCOUNTER — APPOINTMENT (OUTPATIENT)
Dept: URBAN - METROPOLITAN AREA CLINIC 259 | Age: 73
Setting detail: DERMATOLOGY
End: 2023-03-08

## 2023-03-07 DIAGNOSIS — L82.1 OTHER SEBORRHEIC KERATOSIS: ICD-10-CM

## 2023-03-07 DIAGNOSIS — L57.0 ACTINIC KERATOSIS: ICD-10-CM

## 2023-03-07 DIAGNOSIS — L57.8 OTHER SKIN CHANGES DUE TO CHRONIC EXPOSURE TO NONIONIZING RADIATION: ICD-10-CM

## 2023-03-07 DIAGNOSIS — L81.4 OTHER MELANIN HYPERPIGMENTATION: ICD-10-CM

## 2023-03-07 DIAGNOSIS — Z85.828 PERSONAL HISTORY OF OTHER MALIGNANT NEOPLASM OF SKIN: ICD-10-CM

## 2023-03-07 DIAGNOSIS — D22 MELANOCYTIC NEVI: ICD-10-CM

## 2023-03-07 PROBLEM — D22.39 MELANOCYTIC NEVI OF OTHER PARTS OF FACE: Status: ACTIVE | Noted: 2023-03-07

## 2023-03-07 PROBLEM — D22.5 MELANOCYTIC NEVI OF TRUNK: Status: ACTIVE | Noted: 2023-03-07

## 2023-03-07 PROBLEM — D22.62 MELANOCYTIC NEVI OF LEFT UPPER LIMB, INCLUDING SHOULDER: Status: ACTIVE | Noted: 2023-03-07

## 2023-03-07 PROBLEM — D22.61 MELANOCYTIC NEVI OF RIGHT UPPER LIMB, INCLUDING SHOULDER: Status: ACTIVE | Noted: 2023-03-07

## 2023-03-07 PROCEDURE — 17000 DESTRUCT PREMALG LESION: CPT

## 2023-03-07 PROCEDURE — 17003 DESTRUCT PREMALG LES 2-14: CPT

## 2023-03-07 PROCEDURE — OTHER MIPS QUALITY: OTHER

## 2023-03-07 PROCEDURE — OTHER LIQUID NITROGEN: OTHER

## 2023-03-07 PROCEDURE — OTHER ORDER FOR PHOTODYNAMIC THERAPY: OTHER

## 2023-03-07 PROCEDURE — OTHER COUNSELING: OTHER

## 2023-03-07 PROCEDURE — 99213 OFFICE O/P EST LOW 20 MIN: CPT | Mod: 25

## 2023-03-07 ASSESSMENT — LOCATION DETAILED DESCRIPTION DERM
LOCATION DETAILED: SUPERIOR MID FOREHEAD
LOCATION DETAILED: RIGHT ANTERIOR SHOULDER
LOCATION DETAILED: LEFT ANTERIOR PROXIMAL UPPER ARM
LOCATION DETAILED: LEFT SUPERIOR LATERAL MALAR CHEEK
LOCATION DETAILED: EPIGASTRIC SKIN
LOCATION DETAILED: RIGHT INFERIOR TEMPLE
LOCATION DETAILED: LEFT INFERIOR CENTRAL MALAR CHEEK
LOCATION DETAILED: PERIUMBILICAL SKIN
LOCATION DETAILED: LEFT ANTERIOR SHOULDER
LOCATION DETAILED: RIGHT SUPERIOR CENTRAL MALAR CHEEK
LOCATION DETAILED: RIGHT FOREHEAD
LOCATION DETAILED: RIGHT ANTERIOR PROXIMAL UPPER ARM
LOCATION DETAILED: LEFT CENTRAL MALAR CHEEK
LOCATION DETAILED: LEFT FOREHEAD

## 2023-03-07 ASSESSMENT — LOCATION SIMPLE DESCRIPTION DERM
LOCATION SIMPLE: ABDOMEN
LOCATION SIMPLE: RIGHT UPPER ARM
LOCATION SIMPLE: LEFT CHEEK
LOCATION SIMPLE: RIGHT SHOULDER
LOCATION SIMPLE: RIGHT CHEEK
LOCATION SIMPLE: RIGHT FOREHEAD
LOCATION SIMPLE: LEFT UPPER ARM
LOCATION SIMPLE: LEFT FOREHEAD
LOCATION SIMPLE: SUPERIOR FOREHEAD
LOCATION SIMPLE: RIGHT TEMPLE
LOCATION SIMPLE: LEFT SHOULDER

## 2023-03-07 ASSESSMENT — LOCATION ZONE DERM
LOCATION ZONE: FACE
LOCATION ZONE: ARM
LOCATION ZONE: TRUNK

## 2023-03-07 NOTE — PROCEDURE: COUNSELING
Detail Level: Zone
Detail Level: Generalized
Patient Specific Counseling (Will Not Stick From Patient To Patient): \\nPatient has previoulsy had blue light treatment and inquired if this would be helful to do again. Advised he would definitely benefit from it.  We reviewed the procedure.  He will plan to schedule in the fall.

## 2023-03-07 NOTE — PROCEDURE: ORDER FOR PHOTODYNAMIC THERAPY
Face And Scalp Incubation Time: 1 Hour for the face and 2 Hours for the scalp
Occlusion: No
Legs Incubation Time: 2 Hours
Consent: The procedure and risks were reviewed with the patient including but not limited to: burning, pigmentary changes, pain, blistering, scabbing, redness, and the possibility of needing numerous treatments. Strict photoprotection after the procedure was also discussed.
Debridement: Yes
Neck Incubation Time: 1 Hour
Frequency Of Pdt: Single Treatment
Location: Face
Face Incubation Time: 2 Hour
Detail Level: Zone
Photosensitizer: Ameluz
Pdt Type: ANGELA-U
128

## 2023-03-07 NOTE — PROCEDURE: LIQUID NITROGEN
Detail Level: Zone
Duration Of Freeze Thaw-Cycle (Seconds): 0
Number Of Freeze-Thaw Cycles: 1 freeze-thaw cycle
Post-Care Instructions: I reviewed with the patient in detail post-care instructions. Patient is to wear sunprotection, and avoid picking at any of the treated lesions. Pt may apply Vaseline to crusted or scabbing areas.
Render Post-Care Instructions In Note?: yes
Consent: The patient's consent was obtained including but not limited to risks of crusting, scabbing, blistering, scarring, darker or lighter pigmentary change, recurrence, incomplete removal and infection.
Render In Bullet Format When Appropriate: No
Total Number Of Aks Treated: 10

## 2023-03-07 NOTE — PROCEDURE: MIPS QUALITY
Quality 431: Preventive Care And Screening: Unhealthy Alcohol Use - Screening: Patient not identified as an unhealthy alcohol user when screened for unhealthy alcohol use using a systematic screening method
Quality 226: Preventive Care And Screening: Tobacco Use: Screening And Cessation Intervention: Patient screened for tobacco use and is an ex/non-smoker
Detail Level: Detailed
Quality 110: Preventive Care And Screening: Influenza Immunization: Influenza Immunization Ordered or Recommended, but not Administered due to system reason
Quality 111:Pneumonia Vaccination Status For Older Adults: Pneumococcal vaccine (PPSV23) was not administered on or after patient’s 60th birthday and before the end of the measurement period, reason not otherwise specified
Quality 130: Documentation Of Current Medications In The Medical Record: Current Medications Documented

## 2023-05-30 ENCOUNTER — APPOINTMENT (OUTPATIENT)
Dept: URBAN - METROPOLITAN AREA CLINIC 257 | Age: 73
Setting detail: DERMATOLOGY
End: 2023-05-31

## 2023-05-30 DIAGNOSIS — L57.0 ACTINIC KERATOSIS: ICD-10-CM

## 2023-05-30 DIAGNOSIS — L82.0 INFLAMED SEBORRHEIC KERATOSIS: ICD-10-CM

## 2023-05-30 DIAGNOSIS — D22 MELANOCYTIC NEVI: ICD-10-CM

## 2023-05-30 DIAGNOSIS — Z85.828 PERSONAL HISTORY OF OTHER MALIGNANT NEOPLASM OF SKIN: ICD-10-CM

## 2023-05-30 DIAGNOSIS — D49.2 NEOPLASM OF UNSPECIFIED BEHAVIOR OF BONE, SOFT TISSUE, AND SKIN: ICD-10-CM

## 2023-05-30 DIAGNOSIS — L57.8 OTHER SKIN CHANGES DUE TO CHRONIC EXPOSURE TO NONIONIZING RADIATION: ICD-10-CM

## 2023-05-30 PROBLEM — D22.5 MELANOCYTIC NEVI OF TRUNK: Status: ACTIVE | Noted: 2023-05-30

## 2023-05-30 PROCEDURE — 99213 OFFICE O/P EST LOW 20 MIN: CPT | Mod: 25

## 2023-05-30 PROCEDURE — 17000 DESTRUCT PREMALG LESION: CPT | Mod: 59

## 2023-05-30 PROCEDURE — 17110 DESTRUCT B9 LESION 1-14: CPT

## 2023-05-30 PROCEDURE — 11102 TANGNTL BX SKIN SINGLE LES: CPT | Mod: 59

## 2023-05-30 PROCEDURE — OTHER COUNSELING: OTHER

## 2023-05-30 PROCEDURE — OTHER BIOPSY BY SHAVE METHOD: OTHER

## 2023-05-30 PROCEDURE — OTHER MIPS QUALITY: OTHER

## 2023-05-30 PROCEDURE — OTHER LIQUID NITROGEN: OTHER

## 2023-05-30 ASSESSMENT — LOCATION DETAILED DESCRIPTION DERM
LOCATION DETAILED: INFERIOR THORACIC SPINE
LOCATION DETAILED: RIGHT ANTERIOR SHOULDER
LOCATION DETAILED: RIGHT PROXIMAL RADIAL DORSAL FOREARM
LOCATION DETAILED: LEFT SUPERIOR PARIETAL SCALP

## 2023-05-30 ASSESSMENT — LOCATION SIMPLE DESCRIPTION DERM
LOCATION SIMPLE: SCALP
LOCATION SIMPLE: RIGHT FOREARM
LOCATION SIMPLE: RIGHT SHOULDER
LOCATION SIMPLE: UPPER BACK

## 2023-05-30 ASSESSMENT — LOCATION ZONE DERM
LOCATION ZONE: SCALP
LOCATION ZONE: TRUNK
LOCATION ZONE: ARM

## 2023-05-30 NOTE — PROCEDURE: LIQUID NITROGEN
Include Z78.9 (Other Specified Conditions Influencing Health Status) As An Associated Diagnosis?: No
Show Spray Paint Technique Variable?: Yes
Consent: The patient's consent was obtained including but not limited to risks of crusting, scabbing, blistering, scarring, darker or lighter pigmentary change, recurrence, incomplete removal and infection.
Post-Care Instructions: I reviewed with the patient in detail post-care instructions. Patient is to wear sunprotection, and avoid picking at any of the treated lesions. Pt may apply Vaseline to crusted or scabbing areas.
Duration Of Freeze Thaw-Cycle (Seconds): 5
Duration Of Freeze Thaw-Cycle (Seconds): 5-10
Number Of Freeze-Thaw Cycles: 1 freeze-thaw cycle
Medical Necessity Clause: This procedure was medically necessary because the lesions that were treated were:
Medical Necessity Information: It is in your best interest to select a reason for this procedure from the list below. All of these items fulfill various CMS LCD requirements except the new and changing color options.
Detail Level: Detailed
Spray Paint Text: The liquid nitrogen was applied to the skin utilizing a spray paint frosting technique.

## 2023-05-30 NOTE — PROCEDURE: BIOPSY BY SHAVE METHOD
Hide Biopsy Depth?: No
Dressing: bandage
X Size Of Lesion In Cm: 0
Post-Care Instructions: I reviewed with the patient in detail post-care instructions. Patient is to keep the biopsy site dry overnight, and then apply bacitracin twice daily until healed. Patient may apply hydrogen peroxide soaks to remove any crusting.
Anesthesia Volume In Cc (Will Not Render If 0): 0.5
Detail Level: Detailed
Biopsy Method: Dermablade
Electrodesiccation Text: The wound bed was treated with electrodesiccation after the biopsy was performed.
Hemostasis: Drysol
Depth Of Biopsy: dermis
Cryotherapy Text: The wound bed was treated with cryotherapy after the biopsy was performed.
Billing Type: Third-Party Bill
Type Of Destruction Used: Curettage
Silver Nitrate Text: The wound bed was treated with silver nitrate after the biopsy was performed.
Wound Care: Petrolatum
Curettage Text: The wound bed was treated with curettage after the biopsy was performed.
Render Post-Care Instructions In Note?: yes
Notification Instructions: Patient will be notified of biopsy results. However, patient instructed to call the office if not contacted within 2 weeks.
Information: Selecting Yes will display possible errors in your note based on the variables you have selected. This validation is only offered as a suggestion for you. PLEASE NOTE THAT THE VALIDATION TEXT WILL BE REMOVED WHEN YOU FINALIZE YOUR NOTE. IF YOU WANT TO FAX A PRELIMINARY NOTE YOU WILL NEED TO TOGGLE THIS TO 'NO' IF YOU DO NOT WANT IT IN YOUR FAXED NOTE.
Electrodesiccation And Curettage Text: The wound bed was treated with electrodesiccation and curettage after the biopsy was performed.
Biopsy Type: H and E
Consent: Written consent was obtained and risks were reviewed including but not limited to scarring, infection, bleeding, scabbing, incomplete removal, nerve damage and allergy to anesthesia.
Anesthesia Type: 1% lidocaine with epinephrine

## 2023-06-03 ENCOUNTER — HEALTH MAINTENANCE LETTER (OUTPATIENT)
Age: 73
End: 2023-06-03

## 2023-07-25 ENCOUNTER — APPOINTMENT (OUTPATIENT)
Dept: URBAN - METROPOLITAN AREA CLINIC 257 | Age: 73
Setting detail: DERMATOLOGY
End: 2023-07-26

## 2023-07-25 DIAGNOSIS — L57.8 OTHER SKIN CHANGES DUE TO CHRONIC EXPOSURE TO NONIONIZING RADIATION: ICD-10-CM

## 2023-07-25 PROBLEM — C44.612 BASAL CELL CARCINOMA OF SKIN OF RIGHT UPPER LIMB, INCLUDING SHOULDER: Status: ACTIVE | Noted: 2023-07-25

## 2023-07-25 PROCEDURE — 99212 OFFICE O/P EST SF 10 MIN: CPT | Mod: 25

## 2023-07-25 PROCEDURE — OTHER COUNSELING: OTHER

## 2023-07-25 PROCEDURE — 11602 EXC TR-EXT MAL+MARG 1.1-2 CM: CPT

## 2023-07-25 PROCEDURE — OTHER MIPS QUALITY: OTHER

## 2023-07-25 PROCEDURE — OTHER EXCISION: OTHER

## 2023-07-25 NOTE — PROCEDURE: EXCISION
Biopsy Photograph Reviewed: Yes
Size Of Lesion In Cm: 0.7
X Size Of Lesion In Cm (Optional): 0
Size Of Margin In Cm: 0.4
Was An Eye Clamp Used?: No
Eye Clamp Note Details: An eye clamp was used during the procedure.
Excision Method: Elliptical
Saucerization Depth: dermis and superficial adipose tissue
Repair Type: None (Simple)
Suturegard Retention Suture: 2-0 Nylon
Retention Suture Bite Size: 3 mm
Length To Time In Minutes Device Was In Place: 10
Number Of Hemigard Strips Per Side: 1
Undermining Type: Entire Wound
Debridement Text: The wound edges were debrided prior to proceeding with the closure to facilitate wound healing.
Helical Rim Text: The closure involved the helical rim.
Vermilion Border Text: The closure involved the vermilion border.
Nostril Rim Text: The closure involved the nostril rim.
Retention Suture Text: Retention sutures were placed to support the closure and prevent dehiscence.
Suture Removal: 14 days
Lab: [id number]
Graft Donor Site Bandage (Optional-Leave Blank If You Don't Want In Note): Steri-strips and a pressure bandage were applied to the donor site.
Epidermal Closure Graft Donor Site (Optional): simple interrupted
Billing Type: Third-Party Bill
Excision Depth: adipose tissue
Scalpel Size: 15 blade
Anesthesia Type: 1% lidocaine with epinephrine
Additional Anesthesia Volume In Cc: 6
Hemostasis: Electrocautery
Estimated Blood Loss (Cc): minimal
Detail Level: Detailed
Anesthesia Volume In Cc: 3
Deep Sutures: 3-0 PGLC
Epidermal Sutures: 4-0 Prolene
Epidermal Closure: running
Wound Care: Petrolatum
Dressing: dry sterile dressing
Suturegard Intro: Intraoperative tissue expansion was performed, utilizing the SUTUREGARD device, in order to reduce wound tension.
Suturegard Body: The suture ends were repeatedly re-tightened and re-clamped to achieve the desired tissue expansion.
Hemigard Intro: Due to skin fragility and wound tension, it was decided to use HEMIGARD adhesive retention suture devices to permit a linear closure. The skin was cleaned and dried for a 6cm distance away from the wound. Excessive hair, if present, was removed to allow for adhesion.
Hemigard Postcare Instructions: The HEMIGARD strips are to remain completely dry for at least 5-7 days.
Positioning (Leave Blank If You Do Not Want): The patient was placed in a comfortable position exposing the surgical site.
Pre-Excision Curettage Text (Leave Blank If You Do Not Want): Prior to drawing the surgical margin the visible lesion was removed with curettage to clearly define the lesion size.
Complex Repair Preamble Text (Leave Blank If You Do Not Want): Extensive wide undermining was performed.
Intermediate Repair Preamble Text (Leave Blank If You Do Not Want): Undermining was performed with blunt dissection.
Curvilinear Excision Additional Text (Leave Blank If You Do Not Want): The margin was drawn around the clinically apparent lesion.  A curvilinear shape was then drawn on the skin incorporating the lesion and margins.  Incisions were then made along these lines to the appropriate tissue plane and the lesion was extirpated.
Fusiform Excision Additional Text (Leave Blank If You Do Not Want): The margin was drawn around the clinically apparent lesion.  A fusiform shape was then drawn on the skin incorporating the lesion and margins.  Incisions were then made along these lines to the appropriate tissue plane and the lesion was extirpated.
Elliptical Excision Additional Text (Leave Blank If You Do Not Want): The margin was drawn around the clinically apparent lesion.  An elliptical shape was then drawn on the skin incorporating the lesion and margins.  Incisions were then made along these lines to the appropriate tissue plane and the lesion was extirpated.
Saucerization Excision Additional Text (Leave Blank If You Do Not Want): The margin was drawn around the clinically apparent lesion.  Incisions were then made along these lines, in a tangential fashion, to the appropriate tissue plane and the lesion was extirpated.
Slit Excision Additional Text (Leave Blank If You Do Not Want): A linear line was drawn on the skin overlying the lesion. An incision was made slowly until the lesion was visualized.  Once visualized, the lesion was removed with blunt dissection.
Excisional Biopsy Additional Text (Leave Blank If You Do Not Want): The margin was drawn around the clinically apparent lesion. An elliptical shape was then drawn on the skin incorporating the lesion and margins.  Incisions were then made along these lines to the appropriate tissue plane and the lesion was extirpated.
Perilesional Excision Additional Text (Leave Blank If You Do Not Want): The margin was drawn around the clinically apparent lesion. Incisions were then made along these lines to the appropriate tissue plane and the lesion was extirpated.
Repair Performed By Another Provider Text (Leave Blank If You Do Not Want): After the tissue was excised the defect was repaired by another provider.
No Repair - Repaired With Adjacent Surgical Defect Text (Leave Blank If You Do Not Want): After the excision the defect was repaired concurrently with another surgical defect which was in close approximation.
Adjacent Tissue Transfer Text: The defect edges were debeveled with a #15 scalpel blade. Given the location of the defect and the proximity to free margins an adjacent tissue transfer was deemed most appropriate. Using a sterile surgical marker, an appropriate flap was drawn incorporating the defect and placing the expected incisions within the relaxed skin tension lines where possible. The area thus outlined was incised deep to adipose tissue with a #15 scalpel blade. The skin margins were undermined to an appropriate distance in all directions utilizing iris scissors and carried over to close the primary defect.
Advancement Flap (Single) Text: The defect edges were debeveled with a #15 scalpel blade. Given the location of the defect and the proximity to free margins a single advancement flap was deemed most appropriate. Using a sterile surgical marker, an appropriate advancement flap was drawn incorporating the defect and placing the expected incisions within the relaxed skin tension lines where possible. The area thus outlined was incised deep to adipose tissue with a #15 scalpel blade. The skin margins were undermined to an appropriate distance in all directions utilizing iris scissors. Following this, the designed flap was advanced and carried over into the primary defect and sutured into place.
Advancement Flap (Double) Text: The defect edges were debeveled with a #15 scalpel blade. Given the location of the defect and the proximity to free margins a double advancement flap was deemed most appropriate. Using a sterile surgical marker, the appropriate advancement flaps were drawn incorporating the defect and placing the expected incisions within the relaxed skin tension lines where possible. The area thus outlined was incised deep to adipose tissue with a #15 scalpel blade. The skin margins were undermined to an appropriate distance in all directions utilizing iris scissors. Following this, the designed flaps were advanced and carried over into the primary defect and sutured into place.
Burow's Advancement Flap Text: The defect edges were debeveled with a #15 scalpel blade. Given the location of the defect and the proximity to free margins a Burow's advancement flap was deemed most appropriate. Using a sterile surgical marker, the appropriate advancement flap was drawn incorporating the defect and placing the expected incisions within the relaxed skin tension lines where possible. The area thus outlined was incised deep to adipose tissue with a #15 scalpel blade. The skin margins were undermined to an appropriate distance in all directions utilizing iris scissors. Following this, the designed flap was advanced and carried over into the primary defect and sutured into place.
Chonodrocutaneous Helical Advancement Flap Text: The defect edges were debeveled with a #15 scalpel blade. Given the location of the defect and the proximity to free margins a chondrocutaneous helical advancement flap was deemed most appropriate. Using a sterile surgical marker, the appropriate advancement flap was drawn incorporating the defect and placing the expected incisions within the relaxed skin tension lines where possible. The area thus outlined was incised deep to adipose tissue with a #15 scalpel blade. The skin margins were undermined to an appropriate distance in all directions utilizing iris scissors. Following this, the designed flap was advanced and carried over into the primary defect and sutured into place.
Crescentic Advancement Flap Text: The defect edges were debeveled with a #15 scalpel blade. Given the location of the defect and the proximity to free margins a crescentic advancement flap was deemed most appropriate. Using a sterile surgical marker, the appropriate advancement flap was drawn incorporating the defect and placing the expected incisions within the relaxed skin tension lines where possible. The area thus outlined was incised deep to adipose tissue with a #15 scalpel blade. The skin margins were undermined to an appropriate distance in all directions utilizing iris scissors. Following this, the designed flap was advanced and carried over into the primary defect and sutured into place.
A-T Advancement Flap Text: The defect edges were debeveled with a #15 scalpel blade. Given the location of the defect, shape of the defect and the proximity to free margins an A-T advancement flap was deemed most appropriate. Using a sterile surgical marker, an appropriate advancement flap was drawn incorporating the defect and placing the expected incisions within the relaxed skin tension lines where possible. The area thus outlined was incised deep to adipose tissue with a #15 scalpel blade. The skin margins were undermined to an appropriate distance in all directions utilizing iris scissors. Following this, the designed flap was advanced and carried over into the primary defect and sutured into place.
O-T Advancement Flap Text: The defect edges were debeveled with a #15 scalpel blade. Given the location of the defect, shape of the defect and the proximity to free margins an O-T advancement flap was deemed most appropriate. Using a sterile surgical marker, an appropriate advancement flap was drawn incorporating the defect and placing the expected incisions within the relaxed skin tension lines where possible. The area thus outlined was incised deep to adipose tissue with a #15 scalpel blade. The skin margins were undermined to an appropriate distance in all directions utilizing iris scissors. Following this, the designed flap was advanced and carried over into the primary defect and sutured into place.
O-L Flap Text: The defect edges were debeveled with a #15 scalpel blade. Given the location of the defect, shape of the defect and the proximity to free margins an O-L flap was deemed most appropriate. Using a sterile surgical marker, an appropriate advancement flap was drawn incorporating the defect and placing the expected incisions within the relaxed skin tension lines where possible. The area thus outlined was incised deep to adipose tissue with a #15 scalpel blade. The skin margins were undermined to an appropriate distance in all directions utilizing iris scissors. Following this, the designed flap was advanced and carried over into the primary defect and sutured into place.
O-Z Flap Text: The defect edges were debeveled with a #15 scalpel blade. Given the location of the defect, shape of the defect and the proximity to free margins an O-Z flap was deemed most appropriate. Using a sterile surgical marker, an appropriate transposition flap was drawn incorporating the defect and placing the expected incisions within the relaxed skin tension lines where possible. The area thus outlined was incised deep to adipose tissue with a #15 scalpel blade. The skin margins were undermined to an appropriate distance in all directions utilizing iris scissors. Following this, the designed flap was carried over into the primary defect and sutured into place.
Double O-Z Flap Text: The defect edges were debeveled with a #15 scalpel blade. Given the location of the defect, shape of the defect and the proximity to free margins a Double O-Z flap was deemed most appropriate. Using a sterile surgical marker, an appropriate transposition flap was drawn incorporating the defect and placing the expected incisions within the relaxed skin tension lines where possible. The area thus outlined was incised deep to adipose tissue with a #15 scalpel blade. The skin margins were undermined to an appropriate distance in all directions utilizing iris scissors. Following this, the designed flap was carried over into the primary defect and sutured into place.
V-Y Flap Text: The defect edges were debeveled with a #15 scalpel blade. Given the location of the defect, shape of the defect and the proximity to free margins a V-Y flap was deemed most appropriate. Using a sterile surgical marker, an appropriate advancement flap was drawn incorporating the defect and placing the expected incisions within the relaxed skin tension lines where possible. The area thus outlined was incised deep to adipose tissue with a #15 scalpel blade. The skin margins were undermined to an appropriate distance in all directions utilizing iris scissors. Following this, the designed flap was advanced and carried over into the primary defect and sutured into place.
Advancement-Rotation Flap Text: The defect edges were debeveled with a #15 scalpel blade. Given the location of the defect, shape of the defect and the proximity to free margins an advancement-rotation flap was deemed most appropriate. Using a sterile surgical marker, an appropriate flap was drawn incorporating the defect and placing the expected incisions within the relaxed skin tension lines where possible. The area thus outlined was incised deep to adipose tissue with a #15 scalpel blade. The skin margins were undermined to an appropriate distance in all directions utilizing iris scissors. Following this, the designed flap was carried over into the primary defect and sutured into place.
Mercedes Flap Text: The defect edges were debeveled with a #15 scalpel blade. Given the location of the defect, shape of the defect and the proximity to free margins a Mercedes flap was deemed most appropriate. Using a sterile surgical marker, an appropriate advancement flap was drawn incorporating the defect and placing the expected incisions within the relaxed skin tension lines where possible. The area thus outlined was incised deep to adipose tissue with a #15 scalpel blade. The skin margins were undermined to an appropriate distance in all directions utilizing iris scissors. Following this, the designed flap was advanced and carried over into the primary defect and sutured into place.
Modified Advancement Flap Text: The defect edges were debeveled with a #15 scalpel blade. Given the location of the defect, shape of the defect and the proximity to free margins a modified advancement flap was deemed most appropriate. Using a sterile surgical marker, an appropriate advancement flap was drawn incorporating the defect and placing the expected incisions within the relaxed skin tension lines where possible. The area thus outlined was incised deep to adipose tissue with a #15 scalpel blade. The skin margins were undermined to an appropriate distance in all directions utilizing iris scissors. Following this, the designed flap was advanced and carried over into the primary defect and sutured into place.
Mucosal Advancement Flap Text: Given the location of the defect, shape of the defect and the proximity to free margins a mucosal advancement flap was deemed most appropriate. Incisions were made with a 15 blade scalpel in the appropriate fashion along the cutaneous vermilion border and the mucosal lip. The remaining actinically damaged mucosal tissue was excised.  The mucosal advancement flap was then elevated to the gingival sulcus with care taken to preserve the neurovascular structures and advanced into the primary defect. Care was taken to ensure that precise realignment of the vermilion border was achieved.
Peng Advancement Flap Text: The defect edges were debeveled with a #15 scalpel blade. Given the location of the defect, shape of the defect and the proximity to free margins a Peng advancement flap was deemed most appropriate. Using a sterile surgical marker, an appropriate advancement flap was drawn incorporating the defect and placing the expected incisions within the relaxed skin tension lines where possible. The area thus outlined was incised deep to adipose tissue with a #15 scalpel blade. The skin margins were undermined to an appropriate distance in all directions utilizing iris scissors. Following this, the designed flap was advanced and carried over into the primary defect and sutured into place.
Hatchet Flap Text: The defect edges were debeveled with a #15 scalpel blade. Given the location of the defect, shape of the defect and the proximity to free margins a hatchet flap was deemed most appropriate. Using a sterile surgical marker, an appropriate hatchet flap was drawn incorporating the defect and placing the expected incisions within the relaxed skin tension lines where possible. The area thus outlined was incised deep to adipose tissue with a #15 scalpel blade. The skin margins were undermined to an appropriate distance in all directions utilizing iris scissors. Following this, the designed flap was carried over into the primary defect and sutured into place.
Rotation Flap Text: The defect edges were debeveled with a #15 scalpel blade. Given the location of the defect, shape of the defect and the proximity to free margins a rotation flap was deemed most appropriate. Using a sterile surgical marker, an appropriate rotation flap was drawn incorporating the defect and placing the expected incisions within the relaxed skin tension lines where possible. The area thus outlined was incised deep to adipose tissue with a #15 scalpel blade. The skin margins were undermined to an appropriate distance in all directions utilizing iris scissors. Following this, the designed flap was carried over into the primary defect and sutured into place.
Bilateral Rotation Flap Text: The defect edges were debeveled with a #15 scalpel blade. Given the location of the defect, shape of the defect and the proximity to free margins a bilateral rotation flap was deemed most appropriate. Using a sterile surgical marker, an appropriate rotation flap was drawn incorporating the defect and placing the expected incisions within the relaxed skin tension lines where possible. The area thus outlined was incised deep to adipose tissue with a #15 scalpel blade. The skin margins were undermined to an appropriate distance in all directions utilizing iris scissors. Following this, the designed flap was carried over into the primary defect and sutured into place.
Spiral Flap Text: The defect edges were debeveled with a #15 scalpel blade. Given the location of the defect, shape of the defect and the proximity to free margins a spiral flap was deemed most appropriate. Using a sterile surgical marker, an appropriate rotation flap was drawn incorporating the defect and placing the expected incisions within the relaxed skin tension lines where possible. The area thus outlined was incised deep to adipose tissue with a #15 scalpel blade. The skin margins were undermined to an appropriate distance in all directions utilizing iris scissors. Following this, the designed flap was carried over into the primary defect and sutured into place.
Staged Advancement Flap Text: The defect edges were debeveled with a #15 scalpel blade. Given the location of the defect, shape of the defect and the proximity to free margins a staged advancement flap was deemed most appropriate. Using a sterile surgical marker, an appropriate advancement flap was drawn incorporating the defect and placing the expected incisions within the relaxed skin tension lines where possible. The area thus outlined was incised deep to adipose tissue with a #15 scalpel blade. The skin margins were undermined to an appropriate distance in all directions utilizing iris scissors. Following this, the designed flap was carried over into the primary defect and sutured into place.
Star Wedge Flap Text: The defect edges were debeveled with a #15 scalpel blade. Given the location of the defect, shape of the defect and the proximity to free margins a star wedge flap was deemed most appropriate. Using a sterile surgical marker, an appropriate rotation flap was drawn incorporating the defect and placing the expected incisions within the relaxed skin tension lines where possible. The area thus outlined was incised deep to adipose tissue with a #15 scalpel blade. The skin margins were undermined to an appropriate distance in all directions utilizing iris scissors. Following this, the designed flap was carried over into the primary defect and sutured into place.
Transposition Flap Text: The defect edges were debeveled with a #15 scalpel blade. Given the location of the defect and the proximity to free margins a transposition flap was deemed most appropriate. Using a sterile surgical marker, an appropriate transposition flap was drawn incorporating the defect. The area thus outlined was incised deep to adipose tissue with a #15 scalpel blade. The skin margins were undermined to an appropriate distance in all directions utilizing iris scissors. Following this, the designed flap was carried over into the primary defect and sutured into place.
Muscle Hinge Flap Text: The defect edges were debeveled with a #15 scalpel blade.  Given the size, depth and location of the defect and the proximity to free margins a muscle hinge flap was deemed most appropriate. Using a sterile surgical marker, an appropriate hinge flap was drawn incorporating the defect. The area thus outlined was incised with a #15 scalpel blade. The skin margins were undermined to an appropriate distance in all directions utilizing iris scissors. Following this, the designed flap was carried into the primary defect and sutured into place.
Mustarde Flap Text: The defect edges were debeveled with a #15 scalpel blade.  Given the size, depth and location of the defect and the proximity to free margins a Mustarde flap was deemed most appropriate. Using a sterile surgical marker, an appropriate flap was drawn incorporating the defect. The area thus outlined was incised with a #15 scalpel blade. The skin margins were undermined to an appropriate distance in all directions utilizing iris scissors. Following this, the designed flap was carried into the primary defect and sutured into place.
Nasal Turnover Hinge Flap Text: The defect edges were debeveled with a #15 scalpel blade.  Given the size, depth, location of the defect and the defect being full thickness a nasal turnover hinge flap was deemed most appropriate. Using a sterile surgical marker, an appropriate hinge flap was drawn incorporating the defect. The area thus outlined was incised with a #15 scalpel blade. The flap was designed to recreate the nasal mucosal lining and the alar rim. The skin margins were undermined to an appropriate distance in all directions utilizing iris scissors. Following this, the designed flap was carried over into the primary defect and sutured into place
Nasalis-Muscle-Based Myocutaneous Island Pedicle Flap Text: Using a #15 blade, an incision was made around the donor flap to the level of the nasalis muscle. Wide lateral undermining was then performed in both the subcutaneous plane above the nasalis muscle, and in a submuscular plane just above periosteum. This allowed the formation of a free nasalis muscle axial pedicle (based on the angular artery) which was still attached to the actual cutaneous flap, increasing its mobility and vascular viability. Hemostasis was obtained with pinpoint electrocoagulation. The flap was mobilized into position and the pivotal anchor points positioned and stabilized with buried interrupted sutures. Subcutaneous and dermal tissues were closed in a multilayered fashion with sutures. Tissue redundancies were excised, and the epidermal edges were apposed without significant tension and sutured with sutures.
Orbicularis Oris Muscle Flap Text: The defect edges were debeveled with a #15 scalpel blade.  Given that the defect affected the competency of the oral sphincter an orbicularis oris muscle flap was deemed most appropriate to restore this competency and normal muscle function.  Using a sterile surgical marker, an appropriate flap was drawn incorporating the defect. The area thus outlined was incised with a #15 scalpel blade. Following this, the designed flap was carried over into the primary defect and sutured into place.
Melolabial Transposition Flap Text: The defect edges were debeveled with a #15 scalpel blade. Given the location of the defect and the proximity to free margins a melolabial flap was deemed most appropriate. Using a sterile surgical marker, an appropriate melolabial transposition flap was drawn incorporating the defect. The area thus outlined was incised deep to adipose tissue with a #15 scalpel blade. The skin margins were undermined to an appropriate distance in all directions utilizing iris scissors. Following this, the designed flap was carried over into the primary defect and sutured into place.
Rhombic Flap Text: The defect edges were debeveled with a #15 scalpel blade. Given the location of the defect and the proximity to free margins a rhombic flap was deemed most appropriate. Using a sterile surgical marker, an appropriate rhombic flap was drawn incorporating the defect. The area thus outlined was incised deep to adipose tissue with a #15 scalpel blade. The skin margins were undermined to an appropriate distance in all directions utilizing iris scissors. Following this, the designed flap was carried over into the primary defect and sutured into place.
Rhomboid Transposition Flap Text: The defect edges were debeveled with a #15 scalpel blade. Given the location of the defect and the proximity to free margins a rhomboid transposition flap was deemed most appropriate. Using a sterile surgical marker, an appropriate rhomboid flap was drawn incorporating the defect. The area thus outlined was incised deep to adipose tissue with a #15 scalpel blade. The skin margins were undermined to an appropriate distance in all directions utilizing iris scissors. Following this, the designed flap was carried over into the primary defect and sutured into place.
Bi-Rhombic Flap Text: The defect edges were debeveled with a #15 scalpel blade. Given the location of the defect and the proximity to free margins a bi-rhombic flap was deemed most appropriate. Using a sterile surgical marker, an appropriate rhombic flap was drawn incorporating the defect. The area thus outlined was incised deep to adipose tissue with a #15 scalpel blade. The skin margins were undermined to an appropriate distance in all directions utilizing iris scissors. Following this, the designed flap was carried over into the primary defect and sutured into place.
Helical Rim Advancement Flap Text: The defect edges were debeveled with a #15 blade scalpel.  Given the location of the defect and the proximity to free margins (helical rim) a double helical rim advancement flap was deemed most appropriate. Using a sterile surgical marker, the appropriate advancement flaps were drawn incorporating the defect and placing the expected incisions between the helical rim and antihelix where possible.  The area thus outlined was incised through and through with a #15 scalpel blade.  With a skin hook and iris scissors, the flaps were gently and sharply undermined and freed up. Folllowing this, the designed flaps were carried over into the primary defect and sutured into place.
Bilateral Helical Rim Advancement Flap Text: The defect edges were debeveled with a #15 blade scalpel.  Given the location of the defect and the proximity to free margins (helical rim) a bilateral helical rim advancement flap was deemed most appropriate. Using a sterile surgical marker, the appropriate advancement flaps were drawn incorporating the defect and placing the expected incisions between the helical rim and antihelix where possible.  The area thus outlined was incised through and through with a #15 scalpel blade.  With a skin hook and iris scissors, the flaps were gently and sharply undermined and freed up. Following this, the designed flaps were placed into the primary defect and sutured into place.
Ear Star Wedge Flap Text: The defect edges were debeveled with a #15 blade scalpel.  Given the location of the defect and the proximity to free margins (helical rim) an ear star wedge flap was deemed most appropriate. Using a sterile surgical marker, the appropriate flap was drawn incorporating the defect and placing the expected incisions between the helical rim and antihelix where possible.  The area thus outlined was incised through and through with a #15 scalpel blade. Following this, the designed flap was carried over into the primary defect and sutured into place.
Banner Transposition Flap Text: The defect edges were debeveled with a #15 scalpel blade. Given the location of the defect and the proximity to free margins a Banner transposition flap was deemed most appropriate. Using a sterile surgical marker, an appropriate flap was drawn around the defect. The area thus outlined was incised deep to adipose tissue with a #15 scalpel blade. The skin margins were undermined to an appropriate distance in all directions utilizing iris scissors. Following this, the designed flap was carried into the primary defect and sutured into place.
Bilobed Flap Text: The defect edges were debeveled with a #15 scalpel blade. Given the location of the defect and the proximity to free margins a bilobe flap was deemed most appropriate. Using a sterile surgical marker, an appropriate bilobe flap drawn around the defect. The area thus outlined was incised deep to adipose tissue with a #15 scalpel blade. The skin margins were undermined to an appropriate distance in all directions utilizing iris scissors. Following this, the designed flap was carried over into the primary defect and sutured into place.
Bilobed Transposition Flap Text: The defect edges were debeveled with a #15 scalpel blade. Given the location of the defect and the proximity to free margins a bilobed transposition flap was deemed most appropriate. Using a sterile surgical marker, an appropriate bilobe flap drawn around the defect. The area thus outlined was incised deep to adipose tissue with a #15 scalpel blade. The skin margins were undermined to an appropriate distance in all directions utilizing iris scissors. Following this, the designed flap was carried over into the primary defect and sutured into place.
Trilobed Flap Text: The defect edges were debeveled with a #15 scalpel blade. Given the location of the defect and the proximity to free margins a trilobed flap was deemed most appropriate. Using a sterile surgical marker, an appropriate trilobed flap was drawn around the defect. The area thus outlined was incised deep to adipose tissue with a #15 scalpel blade. The skin margins were undermined to an appropriate distance in all directions utilizing iris scissors. Following this, the designed flap was carried into the primary defect and sutured into place.
Dorsal Nasal Flap Text: The defect edges were debeveled with a #15 scalpel blade. Given the location of the defect and the proximity to free margins a dorsal nasal flap was deemed most appropriate. Using a sterile surgical marker, an appropriate dorsal nasal flap was drawn around the defect. The area thus outlined was incised deep to adipose tissue with a #15 scalpel blade. The skin margins were undermined to an appropriate distance in all directions utilizing iris scissors. Following this, the designed flap was carried into the primary defect and sutured into place.
Island Pedicle Flap Text: The defect edges were debeveled with a #15 scalpel blade. Given the location of the defect, shape of the defect and the proximity to free margins an island pedicle advancement flap was deemed most appropriate. Using a sterile surgical marker, an appropriate advancement flap was drawn incorporating the defect, outlining the appropriate donor tissue and placing the expected incisions within the relaxed skin tension lines where possible. The area thus outlined was incised deep to adipose tissue with a #15 scalpel blade. The skin margins were undermined to an appropriate distance in all directions around the primary defect and laterally outward around the island pedicle utilizing iris scissors.  There was minimal undermining beneath the pedicle flap. Following this, the flap was carried over into the primary defect and sutured into place.
Island Pedicle Flap With Canthal Suspension Text: The defect edges were debeveled with a #15 scalpel blade. Given the location of the defect, shape of the defect and the proximity to free margins an island pedicle advancement flap was deemed most appropriate. Using a sterile surgical marker, an appropriate advancement flap was drawn incorporating the defect, outlining the appropriate donor tissue and placing the expected incisions within the relaxed skin tension lines where possible. The area thus outlined was incised deep to adipose tissue with a #15 scalpel blade. The skin margins were undermined to an appropriate distance in all directions around the primary defect and laterally outward around the island pedicle utilizing iris scissors.  There was minimal undermining beneath the pedicle flap. A suspension suture was placed in the canthal tendon to prevent tension and prevent ectropion. Following this, the designed flap was placed into the primary defect and sutured into place.
Alar Island Pedicle Flap Text: The defect edges were debeveled with a #15 scalpel blade. Given the location of the defect, shape of the defect and the proximity to the alar rim an island pedicle advancement flap was deemed most appropriate. Using a sterile surgical marker, an appropriate advancement flap was drawn incorporating the defect, outlining the appropriate donor tissue and placing the expected incisions within the nasal ala running parallel to the alar rim. The area thus outlined was incised with a #15 scalpel blade. The skin margins were undermined minimally to an appropriate distance in all directions around the primary defect and laterally outward around the island pedicle utilizing iris scissors.  There was minimal undermining beneath the pedicle flap. Following this, the designed flap was carried over into the primary defect and sutured into place.
Double Island Pedicle Flap Text: The defect edges were debeveled with a #15 scalpel blade. Given the location of the defect, shape of the defect and the proximity to free margins a double island pedicle advancement flap was deemed most appropriate. Using a sterile surgical marker, an appropriate advancement flap was drawn incorporating the defect, outlining the appropriate donor tissue and placing the expected incisions within the relaxed skin tension lines where possible. The area thus outlined was incised deep to adipose tissue with a #15 scalpel blade. The skin margins were undermined to an appropriate distance in all directions around the primary defect and laterally outward around the island pedicle utilizing iris scissors.  There was minimal undermining beneath the pedicle flap. Following this, the flap was carried over into the primary defect and sutured into place.
Island Pedicle Flap-Requiring Vessel Identification Text: The defect edges were debeveled with a #15 scalpel blade. Given the location of the defect, shape of the defect and the proximity to free margins an island pedicle advancement flap was deemed most appropriate. Using a sterile surgical marker, an appropriate advancement flap was drawn, based on the axial vessel mentioned above, incorporating the defect, outlining the appropriate donor tissue and placing the expected incisions within the relaxed skin tension lines where possible. The area thus outlined was incised deep to adipose tissue with a #15 scalpel blade. The skin margins were undermined to an appropriate distance in all directions around the primary defect and laterally outward around the island pedicle utilizing iris scissors.  There was minimal undermining beneath the pedicle flap. Following this, the designed flap was carried over into the primary defect and sutured into place.
Keystone Flap Text: The defect edges were debeveled with a #15 scalpel blade. Given the location of the defect, shape of the defect a keystone flap was deemed most appropriate. Using a sterile surgical marker, an appropriate keystone flap was drawn incorporating the defect, outlining the appropriate donor tissue and placing the expected incisions within the relaxed skin tension lines where possible. The area thus outlined was incised deep to adipose tissue with a #15 scalpel blade. The skin margins were undermined to an appropriate distance in all directions around the primary defect and laterally outward around the flap utilizing iris scissors. Following this, the designed flap was carried into the primary defect and sutured into place.
O-T Plasty Text: The defect edges were debeveled with a #15 scalpel blade. Given the location of the defect, shape of the defect and the proximity to free margins an O-T plasty was deemed most appropriate. Using a sterile surgical marker, an appropriate O-T plasty was drawn incorporating the defect and placing the expected incisions within the relaxed skin tension lines where possible. The area thus outlined was incised deep to adipose tissue with a #15 scalpel blade. The skin margins were undermined to an appropriate distance in all directions utilizing iris scissors. Following this, the designed flap was carried over into the primary defect and sutured into place.
O-Z Plasty Text: The defect edges were debeveled with a #15 scalpel blade. Given the location of the defect, shape of the defect and the proximity to free margins an O-Z plasty (double transposition flap) was deemed most appropriate. Using a sterile surgical marker, the appropriate transposition flaps were drawn incorporating the defect and placing the expected incisions within the relaxed skin tension lines where possible. The area thus outlined was incised deep to adipose tissue with a #15 scalpel blade. The skin margins were undermined to an appropriate distance in all directions utilizing iris scissors. Hemostasis was achieved with electrocautery. The flaps were then transposed and carried over into place, one clockwise and the other counterclockwise, and anchored with interrupted buried subcutaneous sutures.
Double O-Z Plasty Text: The defect edges were debeveled with a #15 scalpel blade. Given the location of the defect, shape of the defect and the proximity to free margins a Double O-Z plasty (double transposition flap) was deemed most appropriate. Using a sterile surgical marker, the appropriate transposition flaps were drawn incorporating the defect and placing the expected incisions within the relaxed skin tension lines where possible. The area thus outlined was incised deep to adipose tissue with a #15 scalpel blade. The skin margins were undermined to an appropriate distance in all directions utilizing iris scissors. Hemostasis was achieved with electrocautery. The flaps were then transposed and carried over into place, one clockwise and the other counterclockwise, and anchored with interrupted buried subcutaneous sutures.
V-Y Plasty Text: The defect edges were debeveled with a #15 scalpel blade. Given the location of the defect, shape of the defect and the proximity to free margins an V-Y advancement flap was deemed most appropriate. Using a sterile surgical marker, an appropriate advancement flap was drawn incorporating the defect and placing the expected incisions within the relaxed skin tension lines where possible. The area thus outlined was incised deep to adipose tissue with a #15 scalpel blade. The skin margins were undermined to an appropriate distance in all directions utilizing iris scissors. Following this, the designed flap was advanced and carried over into the primary defect and sutured into place.
H Plasty Text: Given the location of the defect, shape of the defect and the proximity to free margins a H-plasty was deemed most appropriate for repair. Using a sterile surgical marker, the appropriate advancement arms of the H-plasty were drawn incorporating the defect and placing the expected incisions within the relaxed skin tension lines where possible. The area thus outlined was incised deep to adipose tissue with a #15 scalpel blade. The skin margins were undermined to an appropriate distance in all directions utilizing iris scissors.  The opposing advancement arms were then advanced and carried over into place in opposite direction and anchored with interrupted buried subcutaneous sutures.
W Plasty Text: The lesion was extirpated to the level of the fat with a #15 scalpel blade. Given the location of the defect, shape of the defect and the proximity to free margins a W-plasty was deemed most appropriate for repair. Using a sterile surgical marker, the appropriate transposition arms of the W-plasty were drawn incorporating the defect and placing the expected incisions within the relaxed skin tension lines where possible. The area thus outlined was incised deep to adipose tissue with a #15 scalpel blade. The skin margins were undermined to an appropriate distance in all directions utilizing iris scissors. The opposing transposition arms were then transposed and carried over into place in opposite direction and anchored with interrupted buried subcutaneous sutures.
Z Plasty Text: The lesion was extirpated to the level of the fat with a #15 scalpel blade. Given the location of the defect, shape of the defect and the proximity to free margins a Z-plasty was deemed most appropriate for repair. Using a sterile surgical marker, the appropriate transposition arms of the Z-plasty were drawn incorporating the defect and placing the expected incisions within the relaxed skin tension lines where possible. The area thus outlined was incised deep to adipose tissue with a #15 scalpel blade. The skin margins were undermined to an appropriate distance in all directions utilizing iris scissors. The opposing transposition arms were then transposed and carried over into place in opposite direction and anchored with interrupted buried subcutaneous sutures.
Double Z Plasty Text: The lesion was extirpated to the level of the fat with a #15 scalpel blade. Given the location of the defect, shape of the defect and the proximity to free margins a double Z-plasty was deemed most appropriate for repair. Using a sterile surgical marker, the appropriate transposition arms of the double Z-plasty were drawn incorporating the defect and placing the expected incisions within the relaxed skin tension lines where possible. The area thus outlined was incised deep to adipose tissue with a #15 scalpel blade. The skin margins were undermined to an appropriate distance in all directions utilizing iris scissors. The opposing transposition arms were then transposed and carried over into place in opposite direction and anchored with interrupted buried subcutaneous sutures.
Zygomaticofacial Flap Text: Given the location of the defect, shape of the defect and the proximity to free margins a zygomaticofacial flap was deemed most appropriate for repair. Using a sterile surgical marker, the appropriate flap was drawn incorporating the defect and placing the expected incisions within the relaxed skin tension lines where possible. The area thus outlined was incised deep to adipose tissue with a #15 scalpel blade with preservation of a vascular pedicle.  The skin margins were undermined to an appropriate distance in all directions utilizing iris scissors. The flap was then carried over into the defect and anchored with interrupted buried subcutaneous sutures.
Cheek Interpolation Flap Text: A decision was made to reconstruct the defect utilizing an interpolation axial flap and a staged reconstruction.  A telfa template was made of the defect.  This telfa template was then used to outline the Cheek Interpolation flap.  The donor area for the pedicle flap was then injected with anesthesia.  The flap was excised through the skin and subcutaneous tissue down to the layer of the underlying musculature.  The interpolation flap was carefully excised within this deep plane to maintain its blood supply.  The edges of the donor site were undermined.   The donor site was closed in a primary fashion.  The pedicle was then rotated into position and sutured.  Once the tube was sutured into place, adequate blood supply was confirmed with blanching and refill.  The pedicle was then wrapped with xeroform gauze and dressed appropriately with a telfa and gauze bandage to ensure continued blood supply and protect the attached pedicle.
Cheek-To-Nose Interpolation Flap Text: A decision was made to reconstruct the defect utilizing an interpolation axial flap and a staged reconstruction.  A telfa template was made of the defect.  This telfa template was then used to outline the Cheek-To-Nose Interpolation flap.  The donor area for the pedicle flap was then injected with anesthesia.  The flap was excised through the skin and subcutaneous tissue down to the layer of the underlying musculature.  The interpolation flap was carefully excised within this deep plane to maintain its blood supply.  The edges of the donor site were undermined.   The donor site was closed in a primary fashion.  The pedicle was then rotated into position and sutured.  Once the tube was sutured into place, adequate blood supply was confirmed with blanching and refill.  The pedicle was then wrapped with xeroform gauze and dressed appropriately with a telfa and gauze bandage to ensure continued blood supply and protect the attached pedicle.
Interpolation Flap Text: A decision was made to reconstruct the defect utilizing an interpolation axial flap and a staged reconstruction.  A telfa template was made of the defect.  This telfa template was then used to outline the interpolation flap.  The donor area for the pedicle flap was then injected with anesthesia.  The flap was excised through the skin and subcutaneous tissue down to the layer of the underlying musculature.  The interpolation flap was carefully excised within this deep plane to maintain its blood supply.  The edges of the donor site were undermined.   The donor site was closed in a primary fashion.  The pedicle was then rotated into position and sutured.  Once the tube was sutured into place, adequate blood supply was confirmed with blanching and refill.  The pedicle was then wrapped with xeroform gauze and dressed appropriately with a telfa and gauze bandage to ensure continued blood supply and protect the attached pedicle.
Melolabial Interpolation Flap Text: A decision was made to reconstruct the defect utilizing an interpolation axial flap and a staged reconstruction.  A telfa template was made of the defect.  This telfa template was then used to outline the melolabial interpolation flap.  The donor area for the pedicle flap was then injected with anesthesia.  The flap was excised through the skin and subcutaneous tissue down to the layer of the underlying musculature.  The pedicle flap was carefully excised within this deep plane to maintain its blood supply.  The edges of the donor site were undermined.   The donor site was closed in a primary fashion.  The pedicle was then rotated into position and sutured.  Once the tube was sutured into place, adequate blood supply was confirmed with blanching and refill.  The pedicle was then wrapped with xeroform gauze and dressed appropriately with a telfa and gauze bandage to ensure continued blood supply and protect the attached pedicle.
Mastoid Interpolation Flap Text: A decision was made to reconstruct the defect utilizing an interpolation axial flap and a staged reconstruction.  A telfa template was made of the defect.  This telfa template was then used to outline the mastoid interpolation flap.  The donor area for the pedicle flap was then injected with anesthesia.  The flap was excised through the skin and subcutaneous tissue down to the layer of the underlying musculature.  The pedicle flap was carefully excised within this deep plane to maintain its blood supply.  The edges of the donor site were undermined.   The donor site was closed in a primary fashion.  The pedicle was then rotated into position and sutured.  Once the tube was sutured into place, adequate blood supply was confirmed with blanching and refill.  The pedicle was then wrapped with xeroform gauze and dressed appropriately with a telfa and gauze bandage to ensure continued blood supply and protect the attached pedicle.
Posterior Auricular Interpolation Flap Text: A decision was made to reconstruct the defect utilizing an interpolation axial flap and a staged reconstruction.  A telfa template was made of the defect.  This telfa template was then used to outline the posterior auricular interpolation flap.  The donor area for the pedicle flap was then injected with anesthesia.  The flap was excised through the skin and subcutaneous tissue down to the layer of the underlying musculature.  The pedicle flap was carefully excised within this deep plane to maintain its blood supply.  The edges of the donor site were undermined.   The donor site was closed in a primary fashion.  The pedicle was then rotated into position and sutured.  Once the tube was sutured into place, adequate blood supply was confirmed with blanching and refill.  The pedicle was then wrapped with xeroform gauze and dressed appropriately with a telfa and gauze bandage to ensure continued blood supply and protect the attached pedicle.
Paramedian Forehead Flap Text: A decision was made to reconstruct the defect utilizing an interpolation axial flap and a staged reconstruction.  A telfa template was made of the defect.  This telfa template was then used to outline the paramedian forehead pedicle flap.  The donor area for the pedicle flap was then injected with anesthesia.  The flap was excised through the skin and subcutaneous tissue down to the layer of the underlying musculature.  The pedicle flap was carefully excised within this deep plane to maintain its blood supply.  The edges of the donor site were undermined.   The donor site was closed in a primary fashion.  The pedicle was then rotated into position and sutured.  Once the tube was sutured into place, adequate blood supply was confirmed with blanching and refill.  The pedicle was then wrapped with xeroform gauze and dressed appropriately with a telfa and gauze bandage to ensure continued blood supply and protect the attached pedicle.
Abbe Flap (Upper To Lower Lip) Text: The defect of the lower lip was assessed and measured.  Given the location and size of the defect, an Abbe flap was deemed most appropriate. Using a sterile surgical marker, an appropriate Abbe flap was measured and drawn on the upper lip. Local anesthesia was then infiltrated.  A scalpel was then used to incise the upper lip through and through the skin, vermilion, muscle and mucosa, leaving the flap pedicled on the opposite side.  The flap was then rotated and transferred to the lower lip defect.  The flap was then sutured into place with a three layer technique, closing the orbicularis oris muscle layer with subcutaneous buried sutures, followed by a mucosal layer and an epidermal layer.
Abbe Flap (Lower To Upper Lip) Text: The defect of the upper lip was assessed and measured.  Given the location and size of the defect, an Abbe flap was deemed most appropriate. Using a sterile surgical marker, an appropriate Abbe flap was measured and drawn on the lower lip. Local anesthesia was then infiltrated. A scalpel was then used to incise the upper lip through and through the skin, vermilion, muscle and mucosa, leaving the flap pedicled on the opposite side.  The flap was then rotated and transferred to the lower lip defect.  The flap was then sutured into place with a three layer technique, closing the orbicularis oris muscle layer with subcutaneous buried sutures, followed by a mucosal layer and an epidermal layer.
Estlander Flap (Upper To Lower Lip) Text: The defect of the lower lip was assessed and measured.  Given the location and size of the defect, an Estlander flap was deemed most appropriate. Using a sterile surgical marker, an appropriate Estlander flap was measured and drawn on the upper lip. Local anesthesia was then infiltrated. A scalpel was then used to incise the lateral aspect of the flap, through skin, muscle and mucosa, leaving the flap pedicled medially.  The flap was then rotated and positioned to fill the lower lip defect.  The flap was then sutured into place with a three layer technique, closing the orbicularis oris muscle layer with subcutaneous buried sutures, followed by a mucosal layer and an epidermal layer.
Lip Wedge Excision Repair Text: Given the location of the defect and the proximity to free margins a full thickness wedge repair was deemed most appropriate. Using a sterile surgical marker, the appropriate repair was drawn incorporating the defect and placing the expected incisions perpendicular to the vermilion border.  The vermilion border was also meticulously outlined to ensure appropriate reapproximation during the repair.  The area thus outlined was incised through and through with a #15 scalpel blade.  The muscularis and dermis were reaproximated with deep sutures following hemostasis. Care was taken to realign the vermilion border before proceeding with the superficial closure.  Once the vermilion was realigned the superfical and mucosal closure was finished.
Ftsg Text: The defect edges were debeveled with a #15 scalpel blade. Given the location of the defect, shape of the defect and the proximity to free margins a full thickness skin graft was deemed most appropriate. Using a sterile surgical marker, the primary defect shape was transferred to the donor site. The area thus outlined was incised deep to adipose tissue with a #15 scalpel blade.  The harvested graft was then trimmed of adipose tissue until only dermis and epidermis was left.  The skin margins of the secondary defect were undermined to an appropriate distance in all directions utilizing iris scissors.  The secondary defect was closed with interrupted buried subcutaneous sutures.  The skin edges were then re-apposed with running  sutures.  The skin graft was then placed in the primary defect and oriented appropriately.
Split-Thickness Skin Graft Text: The defect edges were debeveled with a #15 scalpel blade. Given the location of the defect, shape of the defect and the proximity to free margins a split thickness skin graft was deemed most appropriate. Using a sterile surgical marker, the primary defect shape was transferred to the donor site. The split thickness graft was then harvested.  The skin graft was then placed in the primary defect and oriented appropriately.
Pinch Graft Text: The defect edges were debeveled with a #15 scalpel blade. Given the location of the defect, shape of the defect and the proximity to free margins a pinch graft was deemed most appropriate. Using a sterile surgical marker, the primary defect shape was transferred to the donor site. The area thus outlined was incised deep to adipose tissue with a #15 scalpel blade.  The harvested graft was then trimmed of adipose tissue until only dermis and epidermis was left. The skin margins of the secondary defect were undermined to an appropriate distance in all directions utilizing iris scissors.  The secondary defect was closed with interrupted buried subcutaneous sutures.  The skin edges were then re-apposed with running  sutures.  The skin graft was then placed in the primary defect and oriented appropriately.
Burow's Graft Text: The defect edges were debeveled with a #15 scalpel blade. Given the location of the defect, shape of the defect, the proximity to free margins and the presence of a standing cone deformity a Burow's skin graft was deemed most appropriate. The standing cone was removed and this tissue was then trimmed to the shape of the primary defect. The adipose tissue was also removed until only dermis and epidermis were left.  The skin margins of the secondary defect were undermined to an appropriate distance in all directions utilizing iris scissors.  The secondary defect was closed with interrupted buried subcutaneous sutures.  The skin edges were then re-apposed with running  sutures.  The skin graft was then placed in the primary defect and oriented appropriately.
Cartilage Graft Text: The defect edges were debeveled with a #15 scalpel blade. Given the location of the defect, shape of the defect, the fact the defect involved a full thickness cartilage defect a cartilage graft was deemed most appropriate.  An appropriate donor site was identified, cleansed, and anesthetized. The cartilage graft was then harvested and transferred to the recipient site, oriented appropriately and then sutured into place.  The secondary defect was then repaired using a primary closure.
Composite Graft Text: The defect edges were debeveled with a #15 scalpel blade. Given the location of the defect, shape of the defect, the proximity to free margins and the fact the defect was full thickness a composite graft was deemed most appropriate.  The defect was outline and then transferred to the donor site.  A full thickness graft was then excised from the donor site. The graft was then placed in the primary defect, oriented appropriately and then sutured into place.  The secondary defect was then repaired using a primary closure.
Epidermal Autograft Text: The defect edges were debeveled with a #15 scalpel blade. Given the location of the defect, shape of the defect and the proximity to free margins an epidermal autograft was deemed most appropriate. Using a sterile surgical marker, the primary defect shape was transferred to the donor site. The epidermal graft was then harvested.  The skin graft was then placed in the primary defect and oriented appropriately.
Dermal Autograft Text: The defect edges were debeveled with a #15 scalpel blade. Given the location of the defect, shape of the defect and the proximity to free margins a dermal autograft was deemed most appropriate. Using a sterile surgical marker, the primary defect shape was transferred to the donor site. The area thus outlined was incised deep to adipose tissue with a #15 scalpel blade.  The harvested graft was then trimmed of adipose and epidermal tissue until only dermis was left.  The skin graft was then placed in the primary defect and oriented appropriately.
Skin Substitute Text: The defect edges were debeveled with a #15 scalpel blade. Given the location of the defect, shape of the defect and the proximity to free margins a skin substitute graft was deemed most appropriate.  The graft material was trimmed to fit the size of the defect. The graft was then placed in the primary defect and oriented appropriately.
Tissue Cultured Epidermal Autograft Text: The defect edges were debeveled with a #15 scalpel blade. Given the location of the defect, shape of the defect and the proximity to free margins a tissue cultured epidermal autograft was deemed most appropriate.  The graft was then trimmed to fit the size of the defect.  The graft was then placed in the primary defect and oriented appropriately.
Xenograft Text: The defect edges were debeveled with a #15 scalpel blade. Given the location of the defect, shape of the defect and the proximity to free margins a xenograft was deemed most appropriate.  The graft was then trimmed to fit the size of the defect.  The graft was then placed in the primary defect and oriented appropriately.
Purse String (Intermediate) Text: Given the location of the defect and the characteristics of the surrounding skin a purse string intermediate closure was deemed most appropriate.  Undermining was performed circumferentially around the surgical defect.  A purse string suture was then placed and tightened.
Purse String (Simple) Text: Given the location of the defect and the characteristics of the surrounding skin a purse string simple closure was deemed most appropriate.  Undermining was performed circumferentially around the surgical defect.  A purse string suture was then placed and tightened.
Partial Purse String (Intermediate) Text: Given the location of the defect and the characteristics of the surrounding skin an intermediate purse string closure was deemed most appropriate.  Undermining was performed circumferentially around the surgical defect.  A purse string suture was then placed and tightened. Wound tension of the circular defect prevented complete closure of the wound.
Partial Purse String (Simple) Text: Given the location of the defect and the characteristics of the surrounding skin a simple purse string closure was deemed most appropriate.  Undermining was performed circumferentially around the surgical defect.  A purse string suture was then placed and tightened. Wound tension of the circular defect prevented complete closure of the wound.
Complex Repair And Single Advancement Flap Text: The defect edges were debeveled with a #15 scalpel blade.  The primary defect was closed partially with a complex linear closure.  Given the location of the remaining defect, shape of the defect and the proximity to free margins a single advancement flap was deemed most appropriate for complete closure of the defect.  Using a sterile surgical marker, an appropriate advancement flap was drawn incorporating the defect and placing the expected incisions within the relaxed skin tension lines where possible. The area thus outlined was incised deep to adipose tissue with a #15 scalpel blade. The skin margins were undermined to an appropriate distance in all directions utilizing iris scissors and carried over to close the primary defect.
Complex Repair And Double Advancement Flap Text: The defect edges were debeveled with a #15 scalpel blade.  The primary defect was closed partially with a complex linear closure.  Given the location of the remaining defect, shape of the defect and the proximity to free margins a double advancement flap was deemed most appropriate for complete closure of the defect.  Using a sterile surgical marker, an appropriate advancement flap was drawn incorporating the defect and placing the expected incisions within the relaxed skin tension lines where possible. The area thus outlined was incised deep to adipose tissue with a #15 scalpel blade. The skin margins were undermined to an appropriate distance in all directions utilizing iris scissors and carried over to close the primary defect.
Complex Repair And Modified Advancement Flap Text: The defect edges were debeveled with a #15 scalpel blade.  The primary defect was closed partially with a complex linear closure.  Given the location of the remaining defect, shape of the defect and the proximity to free margins a modified advancement flap was deemed most appropriate for complete closure of the defect.  Using a sterile surgical marker, an appropriate advancement flap was drawn incorporating the defect and placing the expected incisions within the relaxed skin tension lines where possible. The area thus outlined was incised deep to adipose tissue with a #15 scalpel blade. The skin margins were undermined to an appropriate distance in all directions utilizing iris scissors and carried over to close the primary defect.
Complex Repair And A-T Advancement Flap Text: The defect edges were debeveled with a #15 scalpel blade.  The primary defect was closed partially with a complex linear closure.  Given the location of the remaining defect, shape of the defect and the proximity to free margins an A-T advancement flap was deemed most appropriate for complete closure of the defect.  Using a sterile surgical marker, an appropriate advancement flap was drawn incorporating the defect and placing the expected incisions within the relaxed skin tension lines where possible. The area thus outlined was incised deep to adipose tissue with a #15 scalpel blade. The skin margins were undermined to an appropriate distance in all directions utilizing iris scissors and carried over to close the primary defect.
Complex Repair And O-T Advancement Flap Text: The defect edges were debeveled with a #15 scalpel blade.  The primary defect was closed partially with a complex linear closure.  Given the location of the remaining defect, shape of the defect and the proximity to free margins an O-T advancement flap was deemed most appropriate for complete closure of the defect.  Using a sterile surgical marker, an appropriate advancement flap was drawn incorporating the defect and placing the expected incisions within the relaxed skin tension lines where possible. The area thus outlined was incised deep to adipose tissue with a #15 scalpel blade. The skin margins were undermined to an appropriate distance in all directions utilizing iris scissors and carried over to close the primary defect.
Complex Repair And O-L Flap Text: The defect edges were debeveled with a #15 scalpel blade.  The primary defect was closed partially with a complex linear closure.  Given the location of the remaining defect, shape of the defect and the proximity to free margins an O-L flap was deemed most appropriate for complete closure of the defect.  Using a sterile surgical marker, an appropriate flap was drawn incorporating the defect and placing the expected incisions within the relaxed skin tension lines where possible. The area thus outlined was incised deep to adipose tissue with a #15 scalpel blade. The skin margins were undermined to an appropriate distance in all directions utilizing iris scissors and carried over to close the primary defect.
Complex Repair And Bilobe Flap Text: The defect edges were debeveled with a #15 scalpel blade.  The primary defect was closed partially with a complex linear closure.  Given the location of the remaining defect, shape of the defect and the proximity to free margins a bilobe flap was deemed most appropriate for complete closure of the defect.  Using a sterile surgical marker, an appropriate advancement flap was drawn incorporating the defect and placing the expected incisions within the relaxed skin tension lines where possible. The area thus outlined was incised deep to adipose tissue with a #15 scalpel blade. The skin margins were undermined to an appropriate distance in all directions utilizing iris scissors and carried over to close the primary defect.
Complex Repair And Melolabial Flap Text: The defect edges were debeveled with a #15 scalpel blade.  The primary defect was closed partially with a complex linear closure.  Given the location of the remaining defect, shape of the defect and the proximity to free margins a melolabial flap was deemed most appropriate for complete closure of the defect.  Using a sterile surgical marker, an appropriate advancement flap was drawn incorporating the defect and placing the expected incisions within the relaxed skin tension lines where possible. The area thus outlined was incised deep to adipose tissue with a #15 scalpel blade. The skin margins were undermined to an appropriate distance in all directions utilizing iris scissors and carried over to close the primary defect.
Complex Repair And Rotation Flap Text: The defect edges were debeveled with a #15 scalpel blade.  The primary defect was closed partially with a complex linear closure.  Given the location of the remaining defect, shape of the defect and the proximity to free margins a rotation flap was deemed most appropriate for complete closure of the defect.  Using a sterile surgical marker, an appropriate advancement flap was drawn incorporating the defect and placing the expected incisions within the relaxed skin tension lines where possible. The area thus outlined was incised deep to adipose tissue with a #15 scalpel blade. The skin margins were undermined to an appropriate distance in all directions utilizing iris scissors and carried over to close the primary defect.
Complex Repair And Rhombic Flap Text: The defect edges were debeveled with a #15 scalpel blade.  The primary defect was closed partially with a complex linear closure.  Given the location of the remaining defect, shape of the defect and the proximity to free margins a rhombic flap was deemed most appropriate for complete closure of the defect.  Using a sterile surgical marker, an appropriate advancement flap was drawn incorporating the defect and placing the expected incisions within the relaxed skin tension lines where possible. The area thus outlined was incised deep to adipose tissue with a #15 scalpel blade. The skin margins were undermined to an appropriate distance in all directions utilizing iris scissors and carried over to close the primary defect.
Complex Repair And Transposition Flap Text: The defect edges were debeveled with a #15 scalpel blade.  The primary defect was closed partially with a complex linear closure.  Given the location of the remaining defect, shape of the defect and the proximity to free margins a transposition flap was deemed most appropriate for complete closure of the defect.  Using a sterile surgical marker, an appropriate advancement flap was drawn incorporating the defect and placing the expected incisions within the relaxed skin tension lines where possible. The area thus outlined was incised deep to adipose tissue with a #15 scalpel blade. The skin margins were undermined to an appropriate distance in all directions utilizing iris scissors and carried over to close the primary defect.
Complex Repair And V-Y Plasty Text: The defect edges were debeveled with a #15 scalpel blade.  The primary defect was closed partially with a complex linear closure.  Given the location of the remaining defect, shape of the defect and the proximity to free margins a V-Y plasty was deemed most appropriate for complete closure of the defect.  Using a sterile surgical marker, an appropriate advancement flap was drawn incorporating the defect and placing the expected incisions within the relaxed skin tension lines where possible. The area thus outlined was incised deep to adipose tissue with a #15 scalpel blade. The skin margins were undermined to an appropriate distance in all directions utilizing iris scissors and carried over to close the primary defect.
Complex Repair And M Plasty Text: The defect edges were debeveled with a #15 scalpel blade.  The primary defect was closed partially with a complex linear closure.  Given the location of the remaining defect, shape of the defect and the proximity to free margins an M plasty was deemed most appropriate for complete closure of the defect.  Using a sterile surgical marker, an appropriate advancement flap was drawn incorporating the defect and placing the expected incisions within the relaxed skin tension lines where possible. The area thus outlined was incised deep to adipose tissue with a #15 scalpel blade. The skin margins were undermined to an appropriate distance in all directions utilizing iris scissors and carried over to close the primary defect.
Complex Repair And Double M Plasty Text: The defect edges were debeveled with a #15 scalpel blade.  The primary defect was closed partially with a complex linear closure.  Given the location of the remaining defect, shape of the defect and the proximity to free margins a double M plasty was deemed most appropriate for complete closure of the defect.  Using a sterile surgical marker, an appropriate advancement flap was drawn incorporating the defect and placing the expected incisions within the relaxed skin tension lines where possible. The area thus outlined was incised deep to adipose tissue with a #15 scalpel blade. The skin margins were undermined to an appropriate distance in all directions utilizing iris scissors and carried over to close the primary defect.
Complex Repair And W Plasty Text: The defect edges were debeveled with a #15 scalpel blade.  The primary defect was closed partially with a complex linear closure.  Given the location of the remaining defect, shape of the defect and the proximity to free margins a W plasty was deemed most appropriate for complete closure of the defect.  Using a sterile surgical marker, an appropriate advancement flap was drawn incorporating the defect and placing the expected incisions within the relaxed skin tension lines where possible. The area thus outlined was incised deep to adipose tissue with a #15 scalpel blade. The skin margins were undermined to an appropriate distance in all directions utilizing iris scissors and carried over to close the primary defect.
Complex Repair And Z Plasty Text: The defect edges were debeveled with a #15 scalpel blade.  The primary defect was closed partially with a complex linear closure.  Given the location of the remaining defect, shape of the defect and the proximity to free margins a Z plasty was deemed most appropriate for complete closure of the defect.  Using a sterile surgical marker, an appropriate advancement flap was drawn incorporating the defect and placing the expected incisions within the relaxed skin tension lines where possible. The area thus outlined was incised deep to adipose tissue with a #15 scalpel blade. The skin margins were undermined to an appropriate distance in all directions utilizing iris scissors and carried over to close the primary defect.
Complex Repair And Dorsal Nasal Flap Text: The defect edges were debeveled with a #15 scalpel blade.  The primary defect was closed partially with a complex linear closure.  Given the location of the remaining defect, shape of the defect and the proximity to free margins a dorsal nasal flap was deemed most appropriate for complete closure of the defect.  Using a sterile surgical marker, an appropriate flap was drawn incorporating the defect and placing the expected incisions within the relaxed skin tension lines where possible. The area thus outlined was incised deep to adipose tissue with a #15 scalpel blade. The skin margins were undermined to an appropriate distance in all directions utilizing iris scissors and carried over to close the primary defect.
Complex Repair And Ftsg Text: The defect edges were debeveled with a #15 scalpel blade.  The primary defect was closed partially with a complex linear closure.  Given the location of the defect, shape of the defect and the proximity to free margins a full thickness skin graft was deemed most appropriate to repair the remaining defect.  The graft was trimmed to fit the size of the remaining defect.  The graft was then placed in the primary defect, oriented appropriately, and sutured into place.
Complex Repair And Burow's Graft Text: The defect edges were debeveled with a #15 scalpel blade.  The primary defect was closed partially with a complex linear closure.  Given the location of the defect, shape of the defect, the proximity to free margins and the presence of a standing cone deformity a Burow's graft was deemed most appropriate to repair the remaining defect.  The graft was trimmed to fit the size of the remaining defect.  The graft was then placed in the primary defect, oriented appropriately, and sutured into place.
Complex Repair And Split-Thickness Skin Graft Text: The defect edges were debeveled with a #15 scalpel blade.  The primary defect was closed partially with a complex linear closure.  Given the location of the defect, shape of the defect and the proximity to free margins a split thickness skin graft was deemed most appropriate to repair the remaining defect.  The graft was trimmed to fit the size of the remaining defect.  The graft was then placed in the primary defect, oriented appropriately, and sutured into place.
Complex Repair And Epidermal Autograft Text: The defect edges were debeveled with a #15 scalpel blade.  The primary defect was closed partially with a complex linear closure.  Given the location of the defect, shape of the defect and the proximity to free margins an epidermal autograft was deemed most appropriate to repair the remaining defect.  The graft was trimmed to fit the size of the remaining defect.  The graft was then placed in the primary defect, oriented appropriately, and sutured into place.
Complex Repair And Dermal Autograft Text: The defect edges were debeveled with a #15 scalpel blade.  The primary defect was closed partially with a complex linear closure.  Given the location of the defect, shape of the defect and the proximity to free margins an dermal autograft was deemed most appropriate to repair the remaining defect.  The graft was trimmed to fit the size of the remaining defect.  The graft was then placed in the primary defect, oriented appropriately, and sutured into place.
Complex Repair And Tissue Cultured Epidermal Autograft Text: The defect edges were debeveled with a #15 scalpel blade.  The primary defect was closed partially with a complex linear closure.  Given the location of the defect, shape of the defect and the proximity to free margins an tissue cultured epidermal autograft was deemed most appropriate to repair the remaining defect.  The graft was trimmed to fit the size of the remaining defect.  The graft was then placed in the primary defect, oriented appropriately, and sutured into place.
Complex Repair And Xenograft Text: The defect edges were debeveled with a #15 scalpel blade.  The primary defect was closed partially with a complex linear closure.  Given the location of the defect, shape of the defect and the proximity to free margins a xenograft was deemed most appropriate to repair the remaining defect.  The graft was trimmed to fit the size of the remaining defect.  The graft was then placed in the primary defect, oriented appropriately, and sutured into place.
Complex Repair And Skin Substitute Graft Text: The defect edges were debeveled with a #15 scalpel blade.  The primary defect was closed partially with a complex linear closure.  Given the location of the remaining defect, shape of the defect and the proximity to free margins a skin substitute graft was deemed most appropriate to repair the remaining defect.  The graft was trimmed to fit the size of the remaining defect.  The graft was then placed in the primary defect, oriented appropriately, and sutured into place.
Path Notes (To The Dermatopathologist): Please check margins.
Consent was obtained from the patient. The risks and benefits to therapy were discussed in detail. Specifically, the risks of infection, scarring, bleeding, prolonged wound healing, incomplete removal, allergy to anesthesia, nerve injury and recurrence were addressed. Prior to the procedure, the treatment site was clearly identified and confirmed by the patient. All components of Universal Protocol/PAUSE Rule completed.
Post-Care Instructions: I reviewed with the patient in detail post-care instructions. Patient is not to engage in any heavy lifting, exercise, or swimming for the next 14 days. Should the patient develop any fevers, chills, bleeding, severe pain patient will contact the office immediately.
Home Suture Removal Text: Patient was provided a home suture removal kit and will remove their sutures at home.  If they have any questions or difficulties they will call the office.
Where Do You Want The Question To Include Opioid Counseling Located?: Case Summary Tab
Information: Selecting Yes will display possible errors in your note based on the variables you have selected. This validation is only offered as a suggestion for you. PLEASE NOTE THAT THE VALIDATION TEXT WILL BE REMOVED WHEN YOU FINALIZE YOUR NOTE. IF YOU WANT TO FAX A PRELIMINARY NOTE YOU WILL NEED TO TOGGLE THIS TO 'NO' IF YOU DO NOT WANT IT IN YOUR FAXED NOTE.

## 2023-07-25 NOTE — HPI: SKIN LESION (BASAL CELL CARCINOMA)
Is This A New Presentation, Or A Follow-Up?: Basal Cell Carcinoma
When Was Basal Cell Biopsied? (Optional): 5/30/2023

## 2023-08-08 ENCOUNTER — APPOINTMENT (OUTPATIENT)
Dept: URBAN - METROPOLITAN AREA CLINIC 259 | Age: 73
Setting detail: DERMATOLOGY
End: 2023-08-08

## 2023-08-08 DIAGNOSIS — Z48.02 ENCOUNTER FOR REMOVAL OF SUTURES: ICD-10-CM

## 2023-08-08 PROCEDURE — OTHER SUTURE REMOVAL (GLOBAL PERIOD): OTHER

## 2023-08-08 ASSESSMENT — LOCATION SIMPLE DESCRIPTION DERM: LOCATION SIMPLE: RIGHT FOREARM

## 2023-08-08 ASSESSMENT — LOCATION ZONE DERM: LOCATION ZONE: ARM

## 2023-08-08 ASSESSMENT — LOCATION DETAILED DESCRIPTION DERM: LOCATION DETAILED: RIGHT PROXIMAL DORSAL FOREARM

## 2023-08-08 NOTE — PROCEDURE: SUTURE REMOVAL (GLOBAL PERIOD)
Body Location Override (Optional - Billing Will Still Be Based On Selected Body Map Location If Applicable): right distal dorsal forearm
Detail Level: Detailed
Add 27746 Cpt? (Important Note: In 2017 The Use Of 03504 Is Being Tracked By Cms To Determine Future Global Period Reimbursement For Global Periods): no

## 2024-03-11 ENCOUNTER — APPOINTMENT (OUTPATIENT)
Dept: URBAN - METROPOLITAN AREA CLINIC 259 | Age: 74
Setting detail: DERMATOLOGY
End: 2024-03-12

## 2024-03-11 DIAGNOSIS — D18.0 HEMANGIOMA: ICD-10-CM

## 2024-03-11 DIAGNOSIS — L81.4 OTHER MELANIN HYPERPIGMENTATION: ICD-10-CM

## 2024-03-11 DIAGNOSIS — D22 MELANOCYTIC NEVI: ICD-10-CM

## 2024-03-11 DIAGNOSIS — Z85.828 PERSONAL HISTORY OF OTHER MALIGNANT NEOPLASM OF SKIN: ICD-10-CM

## 2024-03-11 DIAGNOSIS — L82.1 OTHER SEBORRHEIC KERATOSIS: ICD-10-CM

## 2024-03-11 DIAGNOSIS — Z71.89 OTHER SPECIFIED COUNSELING: ICD-10-CM

## 2024-03-11 DIAGNOSIS — L57.0 ACTINIC KERATOSIS: ICD-10-CM

## 2024-03-11 DIAGNOSIS — L57.8 OTHER SKIN CHANGES DUE TO CHRONIC EXPOSURE TO NONIONIZING RADIATION: ICD-10-CM

## 2024-03-11 PROBLEM — D22.5 MELANOCYTIC NEVI OF TRUNK: Status: ACTIVE | Noted: 2024-03-11

## 2024-03-11 PROBLEM — D18.01 HEMANGIOMA OF SKIN AND SUBCUTANEOUS TISSUE: Status: ACTIVE | Noted: 2024-03-11

## 2024-03-11 PROCEDURE — OTHER MIPS QUALITY: OTHER

## 2024-03-11 PROCEDURE — OTHER LIQUID NITROGEN: OTHER

## 2024-03-11 PROCEDURE — 17004 DESTROY PREMAL LESIONS 15/>: CPT

## 2024-03-11 PROCEDURE — 99213 OFFICE O/P EST LOW 20 MIN: CPT | Mod: 25

## 2024-03-11 PROCEDURE — OTHER COUNSELING: OTHER

## 2024-03-11 ASSESSMENT — LOCATION DETAILED DESCRIPTION DERM
LOCATION DETAILED: LEFT SUPERIOR MEDIAL UPPER BACK
LOCATION DETAILED: RIGHT FOREHEAD
LOCATION DETAILED: RIGHT DISTAL DORSAL FOREARM
LOCATION DETAILED: RIGHT SUPERIOR LATERAL MALAR CHEEK
LOCATION DETAILED: LEFT MEDIAL UPPER BACK
LOCATION DETAILED: LEFT ANTITRAGUS
LOCATION DETAILED: RIGHT LATERAL MALAR CHEEK
LOCATION DETAILED: LEFT INFERIOR TEMPLE
LOCATION DETAILED: LEFT FOREHEAD

## 2024-03-11 ASSESSMENT — LOCATION ZONE DERM
LOCATION ZONE: FACE
LOCATION ZONE: TRUNK
LOCATION ZONE: EAR
LOCATION ZONE: ARM

## 2024-03-11 ASSESSMENT — LOCATION SIMPLE DESCRIPTION DERM
LOCATION SIMPLE: RIGHT FOREARM
LOCATION SIMPLE: LEFT FOREHEAD
LOCATION SIMPLE: RIGHT CHEEK
LOCATION SIMPLE: LEFT UPPER BACK
LOCATION SIMPLE: RIGHT FOREHEAD
LOCATION SIMPLE: LEFT EAR
LOCATION SIMPLE: LEFT TEMPLE

## 2024-03-11 NOTE — PROCEDURE: LIQUID NITROGEN
Render In Bullet Format When Appropriate: No
Duration Of Freeze Thaw-Cycle (Seconds): 0
Post-Care Instructions: I reviewed with the patient in detail post-care instructions. Patient is to wear sunprotection, and avoid picking at any of the treated lesions. Pt may apply Vaseline to crusted or scabbing areas.
Total Number Of Aks Treated: 20
Consent: The patient's consent was obtained including but not limited to risks of crusting, scabbing, blistering, scarring, darker or lighter pigmentary change, recurrence, incomplete removal and infection.
Detail Level: Zone

## 2024-03-11 NOTE — HPI: FULL BODY SKIN EXAMINATION
What Type Of Note Output Would You Prefer (Optional)?: Standard Output
What Is The Reason For Today's Visit?: Full Body Skin Examination
What Is The Reason For Today's Visit? (Being Monitored For X): concerning skin lesions on an annual basis
Additional History: Pt has multiple spot on his face that he would like checked, spots are dry and flakey.

## 2024-06-14 ENCOUNTER — APPOINTMENT (OUTPATIENT)
Dept: URBAN - METROPOLITAN AREA CLINIC 259 | Age: 74
Setting detail: DERMATOLOGY
End: 2024-06-16

## 2024-06-14 DIAGNOSIS — L57.0 ACTINIC KERATOSIS: ICD-10-CM

## 2024-06-14 PROCEDURE — 17000 DESTRUCT PREMALG LESION: CPT

## 2024-06-14 PROCEDURE — OTHER LIQUID NITROGEN: OTHER

## 2024-06-14 PROCEDURE — 17003 DESTRUCT PREMALG LES 2-14: CPT

## 2024-06-14 PROCEDURE — OTHER COUNSELING: OTHER

## 2024-06-14 PROCEDURE — OTHER PHOTO-DOCUMENTATION: OTHER

## 2024-06-14 PROCEDURE — OTHER MIPS QUALITY: OTHER

## 2024-06-14 ASSESSMENT — LOCATION ZONE DERM
LOCATION ZONE: NOSE
LOCATION ZONE: SCALP
LOCATION ZONE: FACE

## 2024-06-14 ASSESSMENT — LOCATION DETAILED DESCRIPTION DERM
LOCATION DETAILED: LEFT INFERIOR CENTRAL MALAR CHEEK
LOCATION DETAILED: RIGHT FOREHEAD
LOCATION DETAILED: LEFT SUPERIOR MEDIAL MALAR CHEEK
LOCATION DETAILED: LEFT NASAL SIDEWALL
LOCATION DETAILED: MID-FRONTAL SCALP
LOCATION DETAILED: RIGHT CENTRAL ZYGOMA
LOCATION DETAILED: LEFT FOREHEAD
LOCATION DETAILED: RIGHT SUPERIOR PREAURICULAR CHEEK
LOCATION DETAILED: LEFT INFERIOR FOREHEAD
LOCATION DETAILED: LEFT SUPERIOR FOREHEAD
LOCATION DETAILED: RIGHT INFERIOR MEDIAL FOREHEAD

## 2024-06-14 ASSESSMENT — LOCATION SIMPLE DESCRIPTION DERM
LOCATION SIMPLE: RIGHT CHEEK
LOCATION SIMPLE: LEFT CHEEK
LOCATION SIMPLE: RIGHT ZYGOMA
LOCATION SIMPLE: ANTERIOR SCALP
LOCATION SIMPLE: RIGHT FOREHEAD
LOCATION SIMPLE: LEFT NOSE
LOCATION SIMPLE: LEFT FOREHEAD

## 2024-06-14 NOTE — PROCEDURE: LIQUID NITROGEN
requesting medication refill. Please approve or deny this request.    Rx requested:  Requested Prescriptions     Pending Prescriptions Disp Refills    amphetamine-dextroamphetamine (ADDERALL XR) 20 MG extended release capsule 60 capsule 0     Sig: Take 1 capsule by mouth 2 times daily for 30 days.  Max Daily Amount: 40 mg         Last Office Visit:   07/06/2023      Next Visit Date:  Future Appointments   Date Time Provider [address] [place] [address]   1/4/2024  2:00 PM William Fallon MD [facility] Neurology -
Render Post-Care Instructions In Note?: yes
Detail Level: Detailed
Consent: The patient's consent was obtained including but not limited to risks of crusting, scabbing, blistering, scarring, darker or lighter pigmentary change, recurrence, incomplete removal and infection.
Duration Of Freeze Thaw-Cycle (Seconds): 3
Render Note In Bullet Format When Appropriate: No
Post-Care Instructions: I reviewed with the patient in detail post-care instructions. Patient is to wear sunprotection, and avoid picking at any of the treated lesions. Pt may apply Vaseline to crusted or scabbing areas.
Number Of Freeze-Thaw Cycles: 3 freeze-thaw cycles

## 2024-06-14 NOTE — PROCEDURE: PHOTO-DOCUMENTATION
Photo Preface (Leave Blank If You Do Not Want): Photographs were obtained today of lesions located on left forehead and scalp.
Detail Level: Zone
Details (Free Text): Advised patient to return to clinic in 1 month to have lesions removed-evaluated. If lesions persist, discussed taking biopsy of lesions.

## 2024-10-03 ENCOUNTER — APPOINTMENT (OUTPATIENT)
Dept: URBAN - METROPOLITAN AREA CLINIC 259 | Age: 74
Setting detail: DERMATOLOGY
End: 2024-10-03

## 2024-10-03 DIAGNOSIS — Z85.828 PERSONAL HISTORY OF OTHER MALIGNANT NEOPLASM OF SKIN: ICD-10-CM

## 2024-10-03 DIAGNOSIS — L57.0 ACTINIC KERATOSIS: ICD-10-CM

## 2024-10-03 PROCEDURE — 99213 OFFICE O/P EST LOW 20 MIN: CPT | Mod: 25

## 2024-10-03 PROCEDURE — 17003 DESTRUCT PREMALG LES 2-14: CPT

## 2024-10-03 PROCEDURE — OTHER COUNSELING: OTHER

## 2024-10-03 PROCEDURE — OTHER LIQUID NITROGEN: OTHER

## 2024-10-03 PROCEDURE — OTHER ADDITIONAL NOTES: OTHER

## 2024-10-03 PROCEDURE — 17000 DESTRUCT PREMALG LESION: CPT

## 2024-10-03 PROCEDURE — OTHER MIPS QUALITY: OTHER

## 2024-10-03 ASSESSMENT — LOCATION SIMPLE DESCRIPTION DERM
LOCATION SIMPLE: RIGHT FOREARM
LOCATION SIMPLE: RIGHT FOREHEAD
LOCATION SIMPLE: POSTERIOR SCALP
LOCATION SIMPLE: RIGHT CHEEK
LOCATION SIMPLE: LEFT CHEEK
LOCATION SIMPLE: NOSE
LOCATION SIMPLE: RIGHT SCALP
LOCATION SIMPLE: CHIN
LOCATION SIMPLE: LEFT FOREHEAD
LOCATION SIMPLE: LEFT SCALP

## 2024-10-03 ASSESSMENT — LOCATION DETAILED DESCRIPTION DERM
LOCATION DETAILED: LEFT MEDIAL FRONTAL SCALP
LOCATION DETAILED: LEFT INFERIOR LATERAL MALAR CHEEK
LOCATION DETAILED: RIGHT MEDIAL FRONTAL SCALP
LOCATION DETAILED: LEFT SUPERIOR FOREHEAD
LOCATION DETAILED: RIGHT SUPERIOR FOREHEAD
LOCATION DETAILED: NASAL SUPRATIP
LOCATION DETAILED: LEFT MENTAL CREASE
LOCATION DETAILED: LEFT CENTRAL MALAR CHEEK
LOCATION DETAILED: RIGHT CENTRAL MALAR CHEEK
LOCATION DETAILED: RIGHT DISTAL DORSAL FOREARM
LOCATION DETAILED: RIGHT CENTRAL FRONTAL SCALP
LOCATION DETAILED: POSTERIOR MID-PARIETAL SCALP

## 2024-10-03 ASSESSMENT — LOCATION ZONE DERM
LOCATION ZONE: NOSE
LOCATION ZONE: ARM
LOCATION ZONE: FACE
LOCATION ZONE: SCALP

## 2024-10-03 NOTE — HPI: SKIN LESION
What Type Of Note Output Would You Prefer (Optional)?: Standard Output
Has Your Skin Lesion Been Treated?: not been treated
Is This A New Presentation, Or A Follow-Up?: Skin Lesions
Additional History: Pt reports noticing a few spots on their face that they would like checked today.

## 2024-10-03 NOTE — PROCEDURE: ADDITIONAL NOTES
Additional Notes: Handout given for the [place] 5FU cream. Discussed LN2 vs. PDT vs. 5FU. Ok for pt to call and let us know if he would like to order the 5FU cream
Detail Level: Simple
Render Risk Assessment In Note?: no

## 2024-10-03 NOTE — PROCEDURE: LIQUID NITROGEN
Render Note In Bullet Format When Appropriate: No
Number Of Freeze-Thaw Cycles: 3 freeze-thaw cycles
Post-Care Instructions: I reviewed with the patient in detail post-care instructions. Patient is to wear sunprotection, and avoid picking at any of the treated lesions. Pt may apply Vaseline to crusted or scabbing areas.
Duration Of Freeze Thaw-Cycle (Seconds): 3
Detail Level: Detailed
Render Post-Care Instructions In Note?: yes
Consent: The patient's consent was obtained including but not limited to risks of crusting, scabbing, blistering, scarring, darker or lighter pigmentary change, recurrence, incomplete removal and infection.

## 2025-03-13 ENCOUNTER — APPOINTMENT (OUTPATIENT)
Dept: URBAN - METROPOLITAN AREA CLINIC 259 | Age: 75
Setting detail: DERMATOLOGY
End: 2025-03-13

## 2025-03-13 DIAGNOSIS — L57.0 ACTINIC KERATOSIS: ICD-10-CM

## 2025-03-13 DIAGNOSIS — L82.1 OTHER SEBORRHEIC KERATOSIS: ICD-10-CM

## 2025-03-13 DIAGNOSIS — L85.3 XEROSIS CUTIS: ICD-10-CM

## 2025-03-13 PROCEDURE — OTHER LIQUID NITROGEN: OTHER

## 2025-03-13 PROCEDURE — OTHER COUNSELING: OTHER

## 2025-03-13 PROCEDURE — OTHER PHOTO-DOCUMENTATION: OTHER

## 2025-03-13 PROCEDURE — OTHER MIPS QUALITY: OTHER

## 2025-03-13 PROCEDURE — OTHER ADDITIONAL NOTES: OTHER

## 2025-03-13 PROCEDURE — 99213 OFFICE O/P EST LOW 20 MIN: CPT | Mod: 25

## 2025-03-13 PROCEDURE — OTHER OTC TREATMENT REGIMEN: OTHER

## 2025-03-13 PROCEDURE — 17000 DESTRUCT PREMALG LESION: CPT

## 2025-03-13 ASSESSMENT — LOCATION ZONE DERM
LOCATION ZONE: NOSE
LOCATION ZONE: SCALP
LOCATION ZONE: FACE
LOCATION ZONE: TRUNK

## 2025-03-13 ASSESSMENT — LOCATION SIMPLE DESCRIPTION DERM
LOCATION SIMPLE: LEFT CHEEK
LOCATION SIMPLE: POSTERIOR SCALP
LOCATION SIMPLE: NOSE
LOCATION SIMPLE: RIGHT CHEEK
LOCATION SIMPLE: CHIN
LOCATION SIMPLE: RIGHT FOREHEAD
LOCATION SIMPLE: LEFT FOREHEAD
LOCATION SIMPLE: LEFT UPPER BACK

## 2025-03-13 ASSESSMENT — LOCATION DETAILED DESCRIPTION DERM
LOCATION DETAILED: LEFT CENTRAL MALAR CHEEK
LOCATION DETAILED: NASAL SUPRATIP
LOCATION DETAILED: RIGHT SUPERIOR FOREHEAD
LOCATION DETAILED: RIGHT CENTRAL MALAR CHEEK
LOCATION DETAILED: RIGHT INFERIOR CENTRAL MALAR CHEEK
LOCATION DETAILED: LEFT INFERIOR CENTRAL MALAR CHEEK
LOCATION DETAILED: LEFT MENTAL CREASE
LOCATION DETAILED: POSTERIOR MID-PARIETAL SCALP
LOCATION DETAILED: LEFT INFERIOR UPPER BACK
LOCATION DETAILED: LEFT SUPERIOR FOREHEAD

## 2025-03-13 NOTE — PROCEDURE: PHOTO-DOCUMENTATION
Detail Level: Zone
Details (Free Text): Gave reassurance to lesion on back. Discussed treatment of LN if ever bothersome.
Photo Preface (Leave Blank If You Do Not Want): Photographs were obtained today

## 2025-03-13 NOTE — HPI: SKIN LESION
What Type Of Note Output Would You Prefer (Optional)?: Standard Output
Has Your Skin Lesion Been Treated?: not been treated
Is This A New Presentation, Or A Follow-Up?: Skin Lesions
Additional History: Pt reports wife noticing a spot on his back that has been there for about a month. He reports it being raised, rough, and red. He would also like to have his scalp examined and potentially treated.

## 2025-03-13 NOTE — PROCEDURE: ADDITIONAL NOTES
Additional Notes: Discussed additionally chemotherapy cream vs PDT. Pt does not prefer to do PDT again and will consider cream at a later date given he isn’t ready yet for the aggressive treatment.
Detail Level: Simple
Render Risk Assessment In Note?: no

## 2025-03-13 NOTE — PROCEDURE: OTC TREATMENT REGIMEN
Patient Specific Otc Recommendations (Will Not Stick From Patient To Patient): Samples of cerave facial moisturizer and cerave and cetaphil hydrating facial cleansers.
Detail Level: Zone

## 2025-05-23 ENCOUNTER — APPOINTMENT (OUTPATIENT)
Dept: URBAN - METROPOLITAN AREA CLINIC 259 | Age: 75
Setting detail: DERMATOLOGY
End: 2025-05-27

## 2025-05-23 DIAGNOSIS — L57.0 ACTINIC KERATOSIS: ICD-10-CM

## 2025-05-23 DIAGNOSIS — D69.2 OTHER NONTHROMBOCYTOPENIC PURPURA: ICD-10-CM

## 2025-05-23 PROCEDURE — OTHER LIQUID NITROGEN: OTHER

## 2025-05-23 PROCEDURE — 99213 OFFICE O/P EST LOW 20 MIN: CPT | Mod: 25

## 2025-05-23 PROCEDURE — 17000 DESTRUCT PREMALG LESION: CPT

## 2025-05-23 PROCEDURE — OTHER MIPS QUALITY: OTHER

## 2025-05-23 PROCEDURE — 17003 DESTRUCT PREMALG LES 2-14: CPT

## 2025-05-23 PROCEDURE — OTHER COUNSELING: OTHER

## 2025-05-23 ASSESSMENT — LOCATION SIMPLE DESCRIPTION DERM
LOCATION SIMPLE: LEFT TEMPLE
LOCATION SIMPLE: RIGHT CHEEK
LOCATION SIMPLE: LEFT FOREHEAD
LOCATION SIMPLE: RIGHT FOREARM
LOCATION SIMPLE: LEFT CHEEK
LOCATION SIMPLE: ANTERIOR SCALP

## 2025-05-23 ASSESSMENT — LOCATION ZONE DERM
LOCATION ZONE: ARM
LOCATION ZONE: FACE
LOCATION ZONE: SCALP

## 2025-05-23 ASSESSMENT — LOCATION DETAILED DESCRIPTION DERM
LOCATION DETAILED: RIGHT VENTRAL DISTAL FOREARM
LOCATION DETAILED: LEFT INFERIOR CENTRAL MALAR CHEEK
LOCATION DETAILED: MID-FRONTAL SCALP
LOCATION DETAILED: LEFT INFERIOR TEMPLE
LOCATION DETAILED: RIGHT VENTRAL PROXIMAL FOREARM
LOCATION DETAILED: LEFT INFERIOR LATERAL FOREHEAD
LOCATION DETAILED: RIGHT LATERAL MALAR CHEEK

## 2025-06-13 ENCOUNTER — APPOINTMENT (OUTPATIENT)
Dept: URBAN - METROPOLITAN AREA CLINIC 259 | Age: 75
Setting detail: DERMATOLOGY
End: 2025-06-15

## 2025-06-13 DIAGNOSIS — D69.2 OTHER NONTHROMBOCYTOPENIC PURPURA: ICD-10-CM

## 2025-06-13 DIAGNOSIS — L57.0 ACTINIC KERATOSIS: ICD-10-CM

## 2025-06-13 PROCEDURE — 17000 DESTRUCT PREMALG LESION: CPT

## 2025-06-13 PROCEDURE — 99212 OFFICE O/P EST SF 10 MIN: CPT | Mod: 25

## 2025-06-13 PROCEDURE — 17003 DESTRUCT PREMALG LES 2-14: CPT

## 2025-06-13 PROCEDURE — OTHER LIQUID NITROGEN: OTHER

## 2025-06-13 PROCEDURE — OTHER REASSURANCE: OTHER

## 2025-06-13 PROCEDURE — OTHER COUNSELING: OTHER

## 2025-06-13 ASSESSMENT — LOCATION SIMPLE DESCRIPTION DERM
LOCATION SIMPLE: LEFT CHEEK
LOCATION SIMPLE: RIGHT CHEEK
LOCATION SIMPLE: LEFT UPPER ARM

## 2025-06-13 ASSESSMENT — LOCATION DETAILED DESCRIPTION DERM
LOCATION DETAILED: LEFT INFERIOR LATERAL MALAR CHEEK
LOCATION DETAILED: RIGHT SUPERIOR LATERAL MALAR CHEEK
LOCATION DETAILED: RIGHT SUPERIOR CENTRAL MALAR CHEEK
LOCATION DETAILED: LEFT ANTERIOR PROXIMAL UPPER ARM

## 2025-06-13 ASSESSMENT — LOCATION ZONE DERM
LOCATION ZONE: ARM
LOCATION ZONE: FACE

## 2025-07-14 ENCOUNTER — APPOINTMENT (OUTPATIENT)
Dept: URBAN - METROPOLITAN AREA CLINIC 259 | Age: 75
Setting detail: DERMATOLOGY
End: 2025-07-14

## 2025-07-14 DIAGNOSIS — L82.1 OTHER SEBORRHEIC KERATOSIS: ICD-10-CM

## 2025-07-14 DIAGNOSIS — L57.0 ACTINIC KERATOSIS: ICD-10-CM

## 2025-07-14 PROCEDURE — 17000 DESTRUCT PREMALG LESION: CPT

## 2025-07-14 PROCEDURE — OTHER COUNSELING: OTHER

## 2025-07-14 PROCEDURE — 17003 DESTRUCT PREMALG LES 2-14: CPT

## 2025-07-14 PROCEDURE — 99213 OFFICE O/P EST LOW 20 MIN: CPT | Mod: 25

## 2025-07-14 PROCEDURE — OTHER LIQUID NITROGEN: OTHER

## 2025-07-14 ASSESSMENT — LOCATION SIMPLE DESCRIPTION DERM
LOCATION SIMPLE: LEFT CHEEK
LOCATION SIMPLE: RIGHT TEMPLE
LOCATION SIMPLE: LEFT FOREHEAD
LOCATION SIMPLE: RIGHT CHEEK
LOCATION SIMPLE: CHEST
LOCATION SIMPLE: RIGHT FOREHEAD
LOCATION SIMPLE: ANTERIOR SCALP

## 2025-07-14 ASSESSMENT — LOCATION DETAILED DESCRIPTION DERM
LOCATION DETAILED: LEFT LATERAL FOREHEAD
LOCATION DETAILED: MID-FRONTAL SCALP
LOCATION DETAILED: STERNUM
LOCATION DETAILED: RIGHT LATERAL MALAR CHEEK
LOCATION DETAILED: LEFT INFERIOR LATERAL MALAR CHEEK
LOCATION DETAILED: LEFT INFERIOR CENTRAL MALAR CHEEK
LOCATION DETAILED: RIGHT MID TEMPLE
LOCATION DETAILED: RIGHT SUPERIOR CENTRAL MALAR CHEEK
LOCATION DETAILED: RIGHT SUPERIOR FOREHEAD

## 2025-07-14 ASSESSMENT — LOCATION ZONE DERM
LOCATION ZONE: FACE
LOCATION ZONE: SCALP
LOCATION ZONE: TRUNK